# Patient Record
Sex: MALE | Race: OTHER | NOT HISPANIC OR LATINO
[De-identification: names, ages, dates, MRNs, and addresses within clinical notes are randomized per-mention and may not be internally consistent; named-entity substitution may affect disease eponyms.]

---

## 2019-01-05 ENCOUNTER — TRANSCRIPTION ENCOUNTER (OUTPATIENT)
Age: 34
End: 2019-01-05

## 2019-01-06 ENCOUNTER — INPATIENT (INPATIENT)
Facility: HOSPITAL | Age: 34
LOS: 3 days | Discharge: HOME | DRG: 654 | End: 2019-01-10
Attending: SURGERY | Admitting: SURGERY
Payer: COMMERCIAL

## 2019-01-06 VITALS — DIASTOLIC BLOOD PRESSURE: 82 MMHG | SYSTOLIC BLOOD PRESSURE: 132 MMHG | HEART RATE: 86 BPM

## 2019-01-06 DIAGNOSIS — N32.89 OTHER SPECIFIED DISORDERS OF BLADDER: ICD-10-CM

## 2019-01-06 LAB
ALBUMIN SERPL ELPH-MCNC: 4.7 G/DL — SIGNIFICANT CHANGE UP (ref 3.3–5)
ALP SERPL-CCNC: 70 U/L — SIGNIFICANT CHANGE UP (ref 40–120)
ALT FLD-CCNC: 31 U/L — SIGNIFICANT CHANGE UP (ref 10–45)
ANION GAP SERPL CALC-SCNC: 15 MMOL/L — SIGNIFICANT CHANGE UP (ref 5–17)
APTT BLD: 31 SEC — SIGNIFICANT CHANGE UP (ref 27.5–36.3)
AST SERPL-CCNC: 31 U/L — SIGNIFICANT CHANGE UP (ref 10–40)
BASOPHILS # BLD AUTO: 0 K/UL — SIGNIFICANT CHANGE UP (ref 0–0.2)
BASOPHILS NFR BLD AUTO: 0.3 % — SIGNIFICANT CHANGE UP (ref 0–2)
BILIRUB SERPL-MCNC: 0.7 MG/DL — SIGNIFICANT CHANGE UP (ref 0.2–1.2)
BLD GP AB SCN SERPL QL: NEGATIVE — SIGNIFICANT CHANGE UP
BUN SERPL-MCNC: 11 MG/DL — SIGNIFICANT CHANGE UP (ref 7–23)
CALCIUM SERPL-MCNC: 9.3 MG/DL — SIGNIFICANT CHANGE UP (ref 8.4–10.5)
CHLORIDE SERPL-SCNC: 102 MMOL/L — SIGNIFICANT CHANGE UP (ref 96–108)
CO2 SERPL-SCNC: 22 MMOL/L — SIGNIFICANT CHANGE UP (ref 22–31)
CREAT SERPL-MCNC: 1.07 MG/DL — SIGNIFICANT CHANGE UP (ref 0.5–1.3)
EOSINOPHIL # BLD AUTO: 0.1 K/UL — SIGNIFICANT CHANGE UP (ref 0–0.5)
EOSINOPHIL NFR BLD AUTO: 0.8 % — SIGNIFICANT CHANGE UP (ref 0–6)
ETHANOL SERPL-MCNC: 238 MG/DL — HIGH (ref 0–10)
GLUCOSE SERPL-MCNC: 133 MG/DL — HIGH (ref 70–99)
HCT VFR BLD CALC: 52.7 % — HIGH (ref 39–50)
HGB BLD-MCNC: 17.5 G/DL — HIGH (ref 13–17)
INR BLD: 0.96 RATIO — SIGNIFICANT CHANGE UP (ref 0.88–1.16)
LIDOCAIN IGE QN: 59 U/L — SIGNIFICANT CHANGE UP (ref 7–60)
LYMPHOCYTES # BLD AUTO: 29.8 % — SIGNIFICANT CHANGE UP (ref 13–44)
LYMPHOCYTES # BLD AUTO: 4.3 K/UL — HIGH (ref 1–3.3)
MCHC RBC-ENTMCNC: 31.5 PG — SIGNIFICANT CHANGE UP (ref 27–34)
MCHC RBC-ENTMCNC: 33.3 GM/DL — SIGNIFICANT CHANGE UP (ref 32–36)
MCV RBC AUTO: 94.7 FL — SIGNIFICANT CHANGE UP (ref 80–100)
MONOCYTES # BLD AUTO: 0.5 K/UL — SIGNIFICANT CHANGE UP (ref 0–0.9)
MONOCYTES NFR BLD AUTO: 3.7 % — SIGNIFICANT CHANGE UP (ref 2–14)
NEUTROPHILS # BLD AUTO: 9.5 K/UL — HIGH (ref 1.8–7.4)
NEUTROPHILS NFR BLD AUTO: 65.5 % — SIGNIFICANT CHANGE UP (ref 43–77)
PLATELET # BLD AUTO: 225 K/UL — SIGNIFICANT CHANGE UP (ref 150–400)
POTASSIUM SERPL-MCNC: 3.7 MMOL/L — SIGNIFICANT CHANGE UP (ref 3.5–5.3)
POTASSIUM SERPL-SCNC: 3.7 MMOL/L — SIGNIFICANT CHANGE UP (ref 3.5–5.3)
PROT SERPL-MCNC: 7.3 G/DL — SIGNIFICANT CHANGE UP (ref 6–8.3)
PROTHROM AB SERPL-ACNC: 10.9 SEC — SIGNIFICANT CHANGE UP (ref 10–12.9)
RBC # BLD: 5.57 M/UL — SIGNIFICANT CHANGE UP (ref 4.2–5.8)
RBC # FLD: 11.9 % — SIGNIFICANT CHANGE UP (ref 10.3–14.5)
RH IG SCN BLD-IMP: POSITIVE — SIGNIFICANT CHANGE UP
RH IG SCN BLD-IMP: POSITIVE — SIGNIFICANT CHANGE UP
SODIUM SERPL-SCNC: 139 MMOL/L — SIGNIFICANT CHANGE UP (ref 135–145)
WBC # BLD: 14.6 K/UL — HIGH (ref 3.8–10.5)
WBC # FLD AUTO: 14.6 K/UL — HIGH (ref 3.8–10.5)

## 2019-01-06 PROCEDURE — 99285 EMERGENCY DEPT VISIT HI MDM: CPT | Mod: 25

## 2019-01-06 PROCEDURE — 99053 MED SERV 10PM-8AM 24 HR FAC: CPT

## 2019-01-06 PROCEDURE — 99222 1ST HOSP IP/OBS MODERATE 55: CPT | Mod: 57

## 2019-01-06 PROCEDURE — 70450 CT HEAD/BRAIN W/O DYE: CPT | Mod: 26

## 2019-01-06 PROCEDURE — 73030 X-RAY EXAM OF SHOULDER: CPT | Mod: 26,RT

## 2019-01-06 PROCEDURE — 72125 CT NECK SPINE W/O DYE: CPT | Mod: 26

## 2019-01-06 PROCEDURE — 51865 REPAIR OF BLADDER WOUND: CPT

## 2019-01-06 PROCEDURE — 73090 X-RAY EXAM OF FOREARM: CPT | Mod: 26,LT

## 2019-01-06 PROCEDURE — 73110 X-RAY EXAM OF WRIST: CPT | Mod: 26,RT

## 2019-01-06 PROCEDURE — 74177 CT ABD & PELVIS W/CONTRAST: CPT | Mod: 26

## 2019-01-06 PROCEDURE — 73060 X-RAY EXAM OF HUMERUS: CPT | Mod: 26,RT

## 2019-01-06 PROCEDURE — 71260 CT THORAX DX C+: CPT | Mod: 26

## 2019-01-06 PROCEDURE — 72192 CT PELVIS W/O DYE: CPT | Mod: 26

## 2019-01-06 PROCEDURE — 73120 X-RAY EXAM OF HAND: CPT | Mod: 26,RT

## 2019-01-06 PROCEDURE — 99222 1ST HOSP IP/OBS MODERATE 55: CPT

## 2019-01-06 RX ORDER — HYDROMORPHONE HYDROCHLORIDE 2 MG/ML
0.5 INJECTION INTRAMUSCULAR; INTRAVENOUS; SUBCUTANEOUS
Qty: 0 | Refills: 0 | Status: DISCONTINUED | OUTPATIENT
Start: 2019-01-06 | End: 2019-01-08

## 2019-01-06 RX ORDER — DOCUSATE SODIUM 100 MG
100 CAPSULE ORAL THREE TIMES A DAY
Qty: 0 | Refills: 0 | Status: DISCONTINUED | OUTPATIENT
Start: 2019-01-06 | End: 2019-01-09

## 2019-01-06 RX ORDER — HYDROMORPHONE HYDROCHLORIDE 2 MG/ML
0.5 INJECTION INTRAMUSCULAR; INTRAVENOUS; SUBCUTANEOUS EVERY 4 HOURS
Qty: 0 | Refills: 0 | Status: DISCONTINUED | OUTPATIENT
Start: 2019-01-06 | End: 2019-01-06

## 2019-01-06 RX ORDER — FENTANYL CITRATE 50 UG/ML
50 INJECTION INTRAVENOUS ONCE
Qty: 0 | Refills: 0 | Status: DISCONTINUED | OUTPATIENT
Start: 2019-01-06 | End: 2019-01-06

## 2019-01-06 RX ORDER — ONDANSETRON 8 MG/1
4 TABLET, FILM COATED ORAL ONCE
Qty: 0 | Refills: 0 | Status: DISCONTINUED | OUTPATIENT
Start: 2019-01-06 | End: 2019-01-06

## 2019-01-06 RX ORDER — HYDROMORPHONE HYDROCHLORIDE 2 MG/ML
30 INJECTION INTRAMUSCULAR; INTRAVENOUS; SUBCUTANEOUS
Qty: 0 | Refills: 0 | Status: DISCONTINUED | OUTPATIENT
Start: 2019-01-06 | End: 2019-01-08

## 2019-01-06 RX ORDER — CEFTRIAXONE 500 MG/1
1 INJECTION, POWDER, FOR SOLUTION INTRAMUSCULAR; INTRAVENOUS ONCE
Qty: 0 | Refills: 0 | Status: COMPLETED | OUTPATIENT
Start: 2019-01-06 | End: 2019-01-06

## 2019-01-06 RX ORDER — LIDOCAINE 4 G/100G
1 CREAM TOPICAL DAILY
Qty: 0 | Refills: 0 | Status: DISCONTINUED | OUTPATIENT
Start: 2019-01-06 | End: 2019-01-09

## 2019-01-06 RX ORDER — TETANUS TOXOID, REDUCED DIPHTHERIA TOXOID AND ACELLULAR PERTUSSIS VACCINE, ADSORBED 5; 2.5; 8; 8; 2.5 [IU]/.5ML; [IU]/.5ML; UG/.5ML; UG/.5ML; UG/.5ML
0.5 SUSPENSION INTRAMUSCULAR ONCE
Qty: 0 | Refills: 0 | Status: COMPLETED | OUTPATIENT
Start: 2019-01-06 | End: 2019-01-06

## 2019-01-06 RX ORDER — CEFTRIAXONE 500 MG/1
1 INJECTION, POWDER, FOR SOLUTION INTRAMUSCULAR; INTRAVENOUS EVERY 24 HOURS
Qty: 0 | Refills: 0 | Status: DISCONTINUED | OUTPATIENT
Start: 2019-01-07 | End: 2019-01-08

## 2019-01-06 RX ORDER — SENNA PLUS 8.6 MG/1
2 TABLET ORAL AT BEDTIME
Qty: 0 | Refills: 0 | Status: DISCONTINUED | OUTPATIENT
Start: 2019-01-06 | End: 2019-01-09

## 2019-01-06 RX ORDER — CEFTRIAXONE 500 MG/1
INJECTION, POWDER, FOR SOLUTION INTRAMUSCULAR; INTRAVENOUS
Qty: 0 | Refills: 0 | Status: DISCONTINUED | OUTPATIENT
Start: 2019-01-06 | End: 2019-01-08

## 2019-01-06 RX ORDER — OXYBUTYNIN CHLORIDE 5 MG
5 TABLET ORAL EVERY 8 HOURS
Qty: 0 | Refills: 0 | Status: DISCONTINUED | OUTPATIENT
Start: 2019-01-06 | End: 2019-01-09

## 2019-01-06 RX ORDER — ONDANSETRON 8 MG/1
4 TABLET, FILM COATED ORAL EVERY 6 HOURS
Qty: 0 | Refills: 0 | Status: DISCONTINUED | OUTPATIENT
Start: 2019-01-06 | End: 2019-01-08

## 2019-01-06 RX ORDER — HYDROMORPHONE HYDROCHLORIDE 2 MG/ML
0.5 INJECTION INTRAMUSCULAR; INTRAVENOUS; SUBCUTANEOUS
Qty: 0 | Refills: 0 | Status: DISCONTINUED | OUTPATIENT
Start: 2019-01-06 | End: 2019-01-06

## 2019-01-06 RX ORDER — ACETAMINOPHEN 500 MG
1000 TABLET ORAL ONCE
Qty: 0 | Refills: 0 | Status: DISCONTINUED | OUTPATIENT
Start: 2019-01-06 | End: 2019-01-06

## 2019-01-06 RX ORDER — ENOXAPARIN SODIUM 100 MG/ML
40 INJECTION SUBCUTANEOUS DAILY
Qty: 0 | Refills: 0 | Status: DISCONTINUED | OUTPATIENT
Start: 2019-01-06 | End: 2019-01-09

## 2019-01-06 RX ORDER — HEPARIN SODIUM 5000 [USP'U]/ML
5000 INJECTION INTRAVENOUS; SUBCUTANEOUS EVERY 8 HOURS
Qty: 0 | Refills: 0 | Status: DISCONTINUED | OUTPATIENT
Start: 2019-01-06 | End: 2019-01-06

## 2019-01-06 RX ORDER — SODIUM CHLORIDE 9 MG/ML
1000 INJECTION INTRAMUSCULAR; INTRAVENOUS; SUBCUTANEOUS
Qty: 0 | Refills: 0 | Status: DISCONTINUED | OUTPATIENT
Start: 2019-01-06 | End: 2019-01-08

## 2019-01-06 RX ORDER — NALOXONE HYDROCHLORIDE 4 MG/.1ML
0.1 SPRAY NASAL
Qty: 0 | Refills: 0 | Status: DISCONTINUED | OUTPATIENT
Start: 2019-01-06 | End: 2019-01-08

## 2019-01-06 RX ORDER — SODIUM CHLORIDE 9 MG/ML
1000 INJECTION INTRAMUSCULAR; INTRAVENOUS; SUBCUTANEOUS
Qty: 0 | Refills: 0 | Status: DISCONTINUED | OUTPATIENT
Start: 2019-01-06 | End: 2019-01-06

## 2019-01-06 RX ORDER — ACETAMINOPHEN 500 MG
650 TABLET ORAL ONCE
Qty: 0 | Refills: 0 | Status: DISCONTINUED | OUTPATIENT
Start: 2019-01-06 | End: 2019-01-06

## 2019-01-06 RX ADMIN — TETANUS TOXOID, REDUCED DIPHTHERIA TOXOID AND ACELLULAR PERTUSSIS VACCINE, ADSORBED 0.5 MILLILITER(S): 5; 2.5; 8; 8; 2.5 SUSPENSION INTRAMUSCULAR at 05:28

## 2019-01-06 RX ADMIN — SENNA PLUS 2 TABLET(S): 8.6 TABLET ORAL at 21:20

## 2019-01-06 RX ADMIN — FENTANYL CITRATE 50 MICROGRAM(S): 50 INJECTION INTRAVENOUS at 08:25

## 2019-01-06 RX ADMIN — SODIUM CHLORIDE 100 MILLILITER(S): 9 INJECTION INTRAMUSCULAR; INTRAVENOUS; SUBCUTANEOUS at 15:07

## 2019-01-06 RX ADMIN — FENTANYL CITRATE 50 MICROGRAM(S): 50 INJECTION INTRAVENOUS at 08:10

## 2019-01-06 RX ADMIN — HYDROMORPHONE HYDROCHLORIDE 30 MILLILITER(S): 2 INJECTION INTRAMUSCULAR; INTRAVENOUS; SUBCUTANEOUS at 19:24

## 2019-01-06 RX ADMIN — Medication 100 MILLIGRAM(S): at 21:20

## 2019-01-06 RX ADMIN — HYDROMORPHONE HYDROCHLORIDE 30 MILLILITER(S): 2 INJECTION INTRAMUSCULAR; INTRAVENOUS; SUBCUTANEOUS at 14:52
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
## 2019-01-06 NOTE — PRE-ANESTHESIA EVALUATION ADULT - ANESTHESIA, PREVIOUS REACTION, PROFILE
No known problems with GA/MAC in self or family No known problems with GA/MAC in self or family/none

## 2019-01-06 NOTE — ED PROVIDER NOTE - CONSTITUTIONAL, MLM
normal... Well appearing, well nourished, awake, alert, oriented to person, place. Clinically intoxicated
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
## 2019-01-06 NOTE — H&P ADULT - ATTENDING COMMENTS
CT cystogram shows intraperitoneal bladder rupture  I reviewed this with the urology team and taking patient to the operating room  patient also noted to have rib fractures and humerus fracture  will need orthopedic surgery input  will admit to trauma

## 2019-01-06 NOTE — BRIEF OPERATIVE NOTE - COMMENTS
-- start CTX given intraperitoneal perforation  -- keep mims, pt to be discharged w/mims  -- outpt cystogram  -- CLD, advance w/flatus  -- PCA -- start CTX given intraperitoneal perforation  -- keep mims, pt to be discharged w/mims  -- outpt cystogram  -- CLD, advance w/flatus  -- ditropan PRN  -- PCA

## 2019-01-06 NOTE — ED PROVIDER NOTE - MUSCULOSKELETAL, MLM
Strength appropriate for age. Tenderness over distal R wrist w/o obvious deformity. Strength appropriate for age. Tenderness over distal R wrist w/o obvious deformity other than some dry blood. No obvious open fx.
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
## 2019-01-06 NOTE — ED ADULT NURSE REASSESSMENT NOTE - NS ED NURSE REASSESS COMMENT FT1
Patient back from CT. Placed on CM. Indwelling urinary catheter draining red fluid in urometer to gravity.
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
## 2019-01-06 NOTE — H&P ADULT - NSHPPHYSICALEXAM_GEN_ALL_CORE
General: awake, alert, answering questions appropriately  HEENT: PERRL, some old blood in mouth, no blood in ears, no blood in nares  Neck: nontender, no deformity   CV: normotensive, tachycardic  Pulm: breathing comfortably  Chest: left lateral and posterior chest wall tenderness  Back: lumbar paraspinal tenderness, no deformity  Abd: lower abdominal tenderness without rebound or guarding  : Rosas placed with return of hematuria  Extremities: spontaneous movement in all extremities, RUE without palpable deformity, pain on R should ROM,  strength 4/4 b/l  Vascular: b/l palpable radial pulses
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
## 2019-01-06 NOTE — H&P ADULT - NSHPLABSRESULTS_GEN_ALL_CORE
CBC (01-06 @ 05:11)                              17.5<H>                         14.6<H>  )----------------(  225        65.5  % Neutrophils, 29.8  % Lymphocytes, ANC: 9.5<H>                              52.7<H>                BMP (01-06 @ 05:11)             139     |  102     |  11    		Ca++ --      Ca 9.3                ---------------------------------( 133<H>		Mg --                 3.7     |  22      |  1.07  			Ph --        LFTs (01-06 @ 05:11)      TPro 7.3 / Alb 4.7 / TBili 0.7 / DBili -- / AST 31 / ALT 31 / AlkPhos 70    Coags (01-06 @ 05:11)  aPTT 31.0 / INR 0.96 / PT 10.9    CT Pelvis No Cont (01.06.19 @ 09:05)     BLADDER: There is intraperitoneal and extraperitoneal extravasation of   intravesicular contrast tracking cephalad into the abdomen. Open   communication between the bladder lumen and intraperitoneal and   extraperitoneal spaces is identified along the dome of the bladder,   slightly eccentric to the right (605:65 and 4:26). The amount of   intra-abdominal contrast is increased after intravesicular   administration. A Rosas catheter is present within the bladder.    Contrast is present in the bilateral distal ureters.    REPRODUCTIVE ORGANS: The prostate is within normal limits.  LYMPH NODES: No pelvic lymphadenopathy.    VISUALIZED PORTIONS:    ABDOMINAL ORGANS: Within normal limits.  BOWEL: Within normal limits.  PERITONEUM: No ascites.  VESSELS: Within normal limits.  ABDOMINAL WALL: Within normal limits.  BONES: Within normal limits.    IMPRESSION:   Findings consistent with bladder rupture with perforation of the bladder   along the dome, slightly eccentric to the right.    Xray Shoulder 2 Views, Right (01.06.19 @ 09:47)     Acute fractures through the right humeral head and the greater tuberosity   of the humeral head - are seen with minimal lateral displacement of the   greater tuberosity noted. No right glenohumeral dislocation.  The right scapular right acromioclavicular joint are maintained    IMPRESSION:   Acute fractures of the right humeral head and greater tuberosity of the   right humeral head.

## 2019-01-06 NOTE — PROGRESS NOTE ADULT - SUBJECTIVE AND OBJECTIVE BOX
Post op Check    Pt seen and examined complaining of incisional and right shoulder pain  Denies SOB/CP/N/V.     Vital Signs Last 24 Hrs  T(C): 36.4 (06 Jan 2019 16:00), Max: 37.1 (06 Jan 2019 14:05)  T(F): 97.5 (06 Jan 2019 16:00), Max: 98.8 (06 Jan 2019 14:05)  HR: 100 (06 Jan 2019 16:00) (86 - 113)  BP: 138/63 (06 Jan 2019 16:00) (111/54 - 144/66)  BP(mean): 89 (06 Jan 2019 16:00) (77 - 96)  RR: 16 (06 Jan 2019 16:00) (16 - 26)  SpO2: 100% (06 Jan 2019 16:00) (97% - 100%)    I&O's Summary    06 Jan 2019 07:01  -  06 Jan 2019 17:04  --------------------------------------------------------  IN: 300 mL / OUT: 220 mL / NET: 80 mL        Physical Exam  Gen: awake alert NAD AXOX3  Pulm: No respiratory distress, no subcostal retractions  CV: RRR, no JVD  Abd: Soft, approp  tender,  ND incision c/d/i  cadence serosang     : mims in place yellow urine                            17.5   14.6  )-----------( 225      ( 06 Jan 2019 05:11 )             52.7       01-06    139  |  102  |  11  ----------------------------<  133<H>  3.7   |  22  |  1.07    Ca    9.3      06 Jan 2019 05:11    TPro  7.3  /  Alb  4.7  /  TBili  0.7  /  DBili  x   /  AST  31  /  ALT  31  /  AlkPhos  70  01-06
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
## 2019-01-06 NOTE — PRE-ANESTHESIA EVALUATION ADULT - NSANTHADDINFOFT_GEN_ALL_CORE
Discussed GA with patient in detail and all questions sought and answered. Pt. agrees to all plans and wishes to proceed with emergent bladder repair.

## 2019-01-06 NOTE — CONSULT NOTE ADULT - ATTENDING COMMENTS
discussed with patient plan for OR to repair the bladder. Risks of surgery such as persistent leak, injury to the surrounding organs, etc, were discussed. He understands.
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
## 2019-01-07 ENCOUNTER — TRANSCRIPTION ENCOUNTER (OUTPATIENT)
Age: 34
End: 2019-01-07

## 2019-01-07 LAB
ANION GAP SERPL CALC-SCNC: 13 MMOL/L — SIGNIFICANT CHANGE UP (ref 5–17)
BUN SERPL-MCNC: 9 MG/DL — SIGNIFICANT CHANGE UP (ref 7–23)
CALCIUM SERPL-MCNC: 8.9 MG/DL — SIGNIFICANT CHANGE UP (ref 8.4–10.5)
CHLORIDE SERPL-SCNC: 102 MMOL/L — SIGNIFICANT CHANGE UP (ref 96–108)
CO2 SERPL-SCNC: 20 MMOL/L — LOW (ref 22–31)
CREAT SERPL-MCNC: 0.57 MG/DL — SIGNIFICANT CHANGE UP (ref 0.5–1.3)
GLUCOSE SERPL-MCNC: 108 MG/DL — HIGH (ref 70–99)
HCT VFR BLD CALC: 45.4 % — SIGNIFICANT CHANGE UP (ref 39–50)
HGB BLD-MCNC: 14.7 G/DL — SIGNIFICANT CHANGE UP (ref 13–17)
MAGNESIUM SERPL-MCNC: 2.2 MG/DL — SIGNIFICANT CHANGE UP (ref 1.6–2.6)
MCHC RBC-ENTMCNC: 31.1 PG — SIGNIFICANT CHANGE UP (ref 27–34)
MCHC RBC-ENTMCNC: 32.4 GM/DL — SIGNIFICANT CHANGE UP (ref 32–36)
MCV RBC AUTO: 96.2 FL — SIGNIFICANT CHANGE UP (ref 80–100)
PHOSPHATE SERPL-MCNC: 3 MG/DL — SIGNIFICANT CHANGE UP (ref 2.5–4.5)
PLATELET # BLD AUTO: 204 K/UL — SIGNIFICANT CHANGE UP (ref 150–400)
POTASSIUM SERPL-MCNC: 4.6 MMOL/L — SIGNIFICANT CHANGE UP (ref 3.5–5.3)
POTASSIUM SERPL-SCNC: 4.6 MMOL/L — SIGNIFICANT CHANGE UP (ref 3.5–5.3)
RBC # BLD: 4.72 M/UL — SIGNIFICANT CHANGE UP (ref 4.2–5.8)
RBC # FLD: 13.4 % — SIGNIFICANT CHANGE UP (ref 10.3–14.5)
SODIUM SERPL-SCNC: 135 MMOL/L — SIGNIFICANT CHANGE UP (ref 135–145)
WBC # BLD: 17.68 K/UL — HIGH (ref 3.8–10.5)
WBC # FLD AUTO: 17.68 K/UL — HIGH (ref 3.8–10.5)

## 2019-01-07 PROCEDURE — 99232 SBSQ HOSP IP/OBS MODERATE 35: CPT

## 2019-01-07 RX ORDER — ACETAMINOPHEN 500 MG
1000 TABLET ORAL ONCE
Qty: 0 | Refills: 0 | Status: COMPLETED | OUTPATIENT
Start: 2019-01-07 | End: 2019-01-07

## 2019-01-07 RX ADMIN — HYDROMORPHONE HYDROCHLORIDE 30 MILLILITER(S): 2 INJECTION INTRAMUSCULAR; INTRAVENOUS; SUBCUTANEOUS at 07:32

## 2019-01-07 RX ADMIN — LIDOCAINE 1 PATCH: 4 CREAM TOPICAL at 13:28

## 2019-01-07 RX ADMIN — Medication 100 MILLIGRAM(S): at 04:47

## 2019-01-07 RX ADMIN — Medication 100 MILLIGRAM(S): at 13:28

## 2019-01-07 RX ADMIN — Medication 400 MILLIGRAM(S): at 16:43

## 2019-01-07 RX ADMIN — SENNA PLUS 2 TABLET(S): 8.6 TABLET ORAL at 21:16

## 2019-01-07 RX ADMIN — Medication 100 MILLIGRAM(S): at 21:16

## 2019-01-07 RX ADMIN — CEFTRIAXONE 100 GRAM(S): 500 INJECTION, POWDER, FOR SOLUTION INTRAMUSCULAR; INTRAVENOUS at 13:49

## 2019-01-07 RX ADMIN — SODIUM CHLORIDE 100 MILLILITER(S): 9 INJECTION INTRAMUSCULAR; INTRAVENOUS; SUBCUTANEOUS at 04:47

## 2019-01-07 RX ADMIN — ENOXAPARIN SODIUM 40 MILLIGRAM(S): 100 INJECTION SUBCUTANEOUS at 13:28

## 2019-01-07 RX ADMIN — HYDROMORPHONE HYDROCHLORIDE 30 MILLILITER(S): 2 INJECTION INTRAMUSCULAR; INTRAVENOUS; SUBCUTANEOUS at 19:10
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
## 2019-01-07 NOTE — PROGRESS NOTE ADULT - SUBJECTIVE AND OBJECTIVE BOX
Day 1 of Anesthesia Pain Management Service    SUBJECTIVE: Patient is doing well with IV PCA    Pain Scale Score:	[X] Refer to charted pain scores    THERAPY:    [ ] IV PCA Morphine		[ ] 5 mg/mL	[ ] 1 mg/mL  [X] IV PCA Hydromorphone	[ ] 5 mg/mL	[X] 1 mg/mL  [ ] IV PCA Fentanyl		[ ] 50 micrograms/mL    Demand dose: 0.2 mg     Lockout: 6 minutes   Continuous Rate: 0 mg/hr  4 Hour Limit: 4 mg    MEDICATIONS  (STANDING):  cefTRIAXone   IVPB      cefTRIAXone   IVPB 1 Gram(s) IV Intermittent every 24 hours  docusate sodium 100 milliGRAM(s) Oral three times a day  enoxaparin Injectable 40 milliGRAM(s) SubCutaneous daily  HYDROmorphone PCA (1 mG/mL) 30 milliLiter(s) PCA Continuous PCA Continuous  lidocaine   Patch 1 Patch Transdermal daily  senna 2 Tablet(s) Oral at bedtime  sodium chloride 0.9%. 1000 milliLiter(s) (20 mL/Hr) IV Continuous <Continuous>    MEDICATIONS  (PRN):  HYDROmorphone PCA (1 mG/mL) Rescue Clinician Bolus 0.5 milliGRAM(s) IV Push every 15 minutes PRN for Pain Scale GREATER THAN 6  naloxone Injectable 0.1 milliGRAM(s) IV Push every 3 minutes PRN For ANY of the following changes in patient status:  A. RR LESS THAN 10 breaths per minute, B. Oxygen saturation LESS THAN 90%, C. Sedation score of 6  ondansetron Injectable 4 milliGRAM(s) IV Push every 6 hours PRN Nausea  oxybutynin 5 milliGRAM(s) Oral every 8 hours PRN Bladder spasms      OBJECTIVE:    Sedation Score:	[ X] Alert	[ ] Drowsy 	[ ] Arousable	[ ] Asleep	[ ] Unresponsive    Side Effects:	[X ] None	[ ] Nausea	[ ] Vomiting	[ ] Pruritus  		[ ] Other:    Vital Signs Last 24 Hrs  T(C): 36.8 (07 Jan 2019 04:58), Max: 37.2 (06 Jan 2019 20:01)  T(F): 98.3 (07 Jan 2019 04:58), Max: 98.9 (06 Jan 2019 20:01)  HR: 88 (07 Jan 2019 04:58) (88 - 113)  BP: 118/79 (07 Jan 2019 04:58) (111/54 - 144/66)  BP(mean): 89 (06 Jan 2019 16:00) (77 - 96)  RR: 18 (07 Jan 2019 04:58) (16 - 26)  SpO2: 96% (07 Jan 2019 04:58) (95% - 100%)    ASSESSMENT/ PLAN    Therapy to  be:               [X] Continued   [ ] Discontinued   [ ] Changed to PRN Analgesics    Documentation and Verification of current medications:   [X] Done	[ ] Not done, not eligible    Comments: Using 1-4x/hr. Reeducated to use.
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
## 2019-01-07 NOTE — DISCHARGE NOTE ADULT - CARE PROVIDERS DIRECT ADDRESSES
[email or web address]

## 2019-01-07 NOTE — DISCHARGE NOTE ADULT - PATIENT PORTAL LINK FT
You can access the [facility] Patient Portal, offered by [facility], by registering with the following website: [email or web address]

## 2019-01-07 NOTE — DISCHARGE NOTE ADULT - CARE PLAN
Principal Discharge DX:	Bladder rupture  Goal:	[facility]  Assessment and plan of treatment:	Discharge home with 3 way mims,  Abx for 3 days total, Outpatient Cystogram in 10-14 days prior to removing mims.   Activity- No heavy lifting or straining over 15 lbs for the next two weeks;  Driving- Please do not drive until your pain is well controlled and you do not need to take pain medications.  You may shower-Do not submerge or scrub incision sites.  Please pat dry incisions/dressings.  Leave the white steri strips in place, they will fall off on their own in approximately 5-7 days.  Secondary Diagnosis:	Closed fracture of head of right humerus with malunion, subsequent encounter  Goal:	Healing  Assessment and plan of treatment:	Ortho: Non weight bearing to RUE, sling, non-op management.  Active movement of fingers/elbow encouraged  Follow up with Dr Topete in 2 weeks.  Secondary Diagnosis:	Closed fracture of multiple ribs of right side with nonunion, subsequent encounter  Goal:	Pain control  Assessment and plan of treatment:	Please follow up with your Trauma Doctor Dr Valencia only if needed at [address] [place] [address] [address] [place] [address] , phone #[phone].   You may use Salanpas 1% lidocaine patches over the counter for topical pain relief. Principal Discharge DX:	Bladder rupture  Goal:	Mims [facility]  Assessment and plan of treatment:	Discharge home with 3 way mims,  Abx for 3 days total,  Outpatient cystogram prior to removal of Mims in 2 weeks.  Please call for an appointment.    Activity- No heavy lifting or straining over 15 lbs for the next two weeks;  Driving- Please do not drive until your pain is well controlled and you do not need to take pain medications.  You may shower-Do not submerge or scrub incision sites.  Please pat dry incisions/dressings.  Leave the white steri strips in place, they will fall off on their own in approximately 5-7 days.  Secondary Diagnosis:	Closed fracture of head of right humerus with malunion, subsequent encounter  Goal:	Healing  Assessment and plan of treatment:	Ortho: Non weight bearing to RUE, sling, non-op management.  Active movement of fingers/elbow encouraged  Follow up with Dr Topete in 2 weeks.  Secondary Diagnosis:	Closed fracture of multiple ribs of right side with nonunion, subsequent encounter  Goal:	Pain control  Assessment and plan of treatment:	Please follow up with your Trauma Doctor Dr Valencia only if needed at [address] [place] [address] [address] [place] [address] , phone #[phone].   You may use Salanpas 1% lidocaine patches over the counter for topical pain relief. Principal Discharge DX:	Bladder rupture  Goal:	Mims Care  Assessment and plan of treatment:	Discharge home with 3 way mims,  Abx for 3 days total,  Outpatient cystogram prior to removal of Mims in 2 weeks.  Please call for an appointment.    Activity- No heavy lifting or straining over 15 lbs for the next two weeks;  Driving- Please do not drive until your pain is well controlled and you do not need to take pain medications.  You may shower-Do not submerge or scrub incision sites.  Please pat dry incisions/dressings.  Leave the white steri strips in place, they will fall off on their own in approximately 5-7 days.  Secondary Diagnosis:	Closed fracture of head of right humerus with malunion, subsequent encounter  Goal:	Healing  Assessment and plan of treatment:	Ortho: Non weight bearing to RUE, sling, non-op management.  Active movement of fingers/elbow encouraged  Follow up with Dr Topete in 2 weeks.  Secondary Diagnosis:	Closed fracture of multiple ribs of right side with nonunion, subsequent encounter  Goal:	Pain control  Assessment and plan of treatment:	Please follow up with your Trauma Doctor Dr Valencia only if needed at [address] [place] [address] , phone #[phone].   You may use Salanpas 1% lidocaine patches over the counter for topical pain relief.  Goal:	MMF  Assessment and plan of treatment:	Please follow up with Dr. Meadows. You may call the office at [phone].  Use Emilee-dex mouthwash as prescribed. Continue with oral hygiene- brush after every meal.  Take antibiotics as prescribed  Keep wirecutters with you at all times Principal Discharge DX:	Bladder rupture  Goal:	Mims [facility]  Assessment and plan of treatment:	Discharge home with 3 way mims. You will need an outpatient cystogram prior to removal of Mims in 2 weeks.  [facility] call for an appointment at [facility] .     Activity- No heavy lifting or straining over 15 lbs for the next two weeks;  Driving- Please do not drive until your pain is well controlled and you do not need to take pain medications.  You may shower-Do not submerge or scrub incision sites.  Please pat dry incisions/dressings.  Leave the white steri strips in place, they will fall off on their own in approximately 5-7 days.  Secondary Diagnosis:	Closed fracture of head of right humerus with malunion, subsequent encounter  Goal:	Healing  Assessment and plan of treatment:	Ortho: Non weight bearing to RUE, sling, non-op management.  Active movement of fingers/elbow encouraged  Follow up with Dr Topete in 2 weeks.  Secondary Diagnosis:	Closed fracture of multiple ribs of right side with nonunion, subsequent encounter  Goal:	Pain control  Assessment and plan of treatment:	Please follow up with your Trauma Doctor Dr Valencia only if needed at [address] [place] [address] , phone #[phone].   You may use Salanpas 1% lidocaine patches over the counter for topical pain relief.  Secondary Diagnosis:	Closed fracture of left side of mandible with nonunion, unspecified mandibular site, subsequent encounter  Goal:	MMF  Assessment and plan of treatment:	Please follow up with Dr. Meadows. You may call the office at [phone].  Use Emilee-dex mouthwash 2-3x a day. Continue with oral hygiene- brush after every meal.  Take antibiotics as prescribed (1 week)  Keep wirecutters with you at all times.   Dental F/U with outpatient dentist for comprehensive dental care.

## 2019-01-07 NOTE — DISCHARGE NOTE ADULT - PROVIDER TOKENS
TOKEN:'[id number]',TOKTIFFANY:'[id number]',DARCIE:'[id number]' TOKEN:'[id number]',TOKEN:'[id number]',TOKEN:'[id number]',DARCIE:'[id number]'

## 2019-01-07 NOTE — DISCHARGE NOTE ADULT - CARE PROVIDER_API CALL
Salazar Del Valle), Urology  [address] [facility]  [address]  [place] [address]  Phone: [phone]  Fax: [phone]    Rohit Valencia), Surgery; Surgical Critical Care  [address] [place] [address]  Phone: [phone]  Fax: [phone]    Bill Topete), Orthopaedic Surgery  [address] [place] [address] [address]  [place] [address]  Phone: [phone]  Fax: [phone] Salazar Del Valle), Urology  [address] [facility]  [address]  [place] [address]  Phone: [phone]  Fax: [phone]    Rohit Valencia), Surgery; Surgical Critical Care  [address] [place] [address]  Phone: [phone]  Fax: [phone]    Bill Topete), Orthopaedic Surgery  [address] [facility]  [address]  [place] [address]  Phone: [phone]  Fax: [phone]    dEin Meadows (DDS), OralMaxillofacial Surgery  [address] [facility]  [address]  [place] [address]  Phone: [phone]  Fax: [phone]

## 2019-01-07 NOTE — DISCHARGE NOTE ADULT - MEDICATION SUMMARY - MEDICATIONS TO TAKE
I will START or STAY ON the medications listed below when I get home from the hospital:    acetaminophen 325 mg oral tablet  -- 2 tab(s) by mouth every 6 hours, As needed, Mild Pain (1 - 3)  -- Indication: For mild pain    oxyCODONE 5 mg oral tablet  -- 1-2  tab(s) by mouth every 4-6 hours, As Needed MDD:8  -- Caution federal law prohibits the transfer of this drug to any person other  than the person for whom it was prescribed.  It is very important that you take or use this exactly as directed.  Do not skip doses or discontinue unless directed by your doctor.  May cause drowsiness.  Alcohol may intensify this effect.  Use care when operating dangerous machinery.  This prescription cannot be refilled.  Using more of this medication than prescribed may cause serious breathing problems.    -- Indication: For mod pain    senna oral tablet  -- 2 tab(s) by mouth once a day (at bedtime)  -- Indication: For Laxative    docusate sodium 100 mg oral capsule  -- 1 cap(s) by mouth 3 times a day  -- Indication: For laxative    oxybutynin 5 mg oral tablet  -- 1 tab(s) by mouth every 8 hours, As needed, Bladder spasms  -- Indication: For Bladder spasms I will START or STAY ON the medications listed below when I get home from the hospital:    oxyCODONE 5 mg/5 mL oral solution  -- 5 milliliter(s) by mouth every 4 hours, As needed, Moderate Pain (4 - 6) MDD:30  -- Indication: For pain control    chlorhexidine 0.12% mucous membrane liquid  -- 15 milliliter(s) mucous membrane 2 times a day  -- Indication: For mandible fracture    Augmentin ES-600 oral liquid  -- 5 milliliter(s) by mouth 2 times a day   -- Expires___________________  Finish all this medication unless otherwise directed by prescriber.  Refrigerate and shake well.  Expires_______________________  Take with food or milk.    -- Indication: For mandible fracture    oxybutynin 5 mg oral tablet  -- 1 tab(s) by mouth every 8 hours, As needed, Bladder spasms  -- Indication: For Bladder spasms I will START or STAY ON the medications listed below when I get home from the hospital:    oxyCODONE 5 mg/5 mL oral solution  -- 5 milliliter(s) by mouth every 4 hours, As needed, Moderate Pain (4 - 6) MDD:30  -- Indication: For pain control    chlorhexidine 0.12% mucous membrane liquid  -- 15 milliliter(s) mucous membrane 2 times a day  -- Indication: For mandible fracture    Augmentin ES-600 oral liquid  -- 5 milliliter(s) by mouth 2 times a day   -- Expires___________________  Finish all this medication unless otherwise directed by prescriber.  Refrigerate and shake well.  Expires_______________________  Take with food or milk.    -- Indication: For mandible fracture    Augmentin 875 mg-125 mg oral tablet  -- 1 milligram(s) by mouth 2 times a day   -- Finish all this medication unless otherwise directed by prescriber.  Take with food or milk.    -- Indication: For antibiotic    oxybutynin 5 mg oral tablet  -- 1 tab(s) by mouth every 8 hours, As needed, Bladder spasms  -- Indication: For Bladder spasms
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
## 2019-01-07 NOTE — DISCHARGE NOTE ADULT - PLAN OF CARE
[facility] Discharge home with 3 way mims,  Abx for 3 days total, Outpatient Cystogram in 10-14 days prior to removing mims.   Activity- No heavy lifting or straining over 15 lbs for the next two weeks;  Driving- Please do not drive until your pain is well controlled and you do not need to take pain medications.  You may shower-Do not submerge or scrub incision sites.  Please pat dry incisions/dressings.  Leave the white steri strips in place, they will fall off on their own in approximately 5-7 days. [facility] Ortho: Non weight bearing to RUE, sling, non-op management.  Active movement of fingers/elbow encouraged  Follow up with Dr Topete in 2 weeks. Pain control Please follow up with your Trauma Doctor Dr Valencia only if needed at [address] [place] [address] [address], [place] [address] , phone #[phone].   You may use Salanpas 1% lidocaine patches over the counter for topical pain relief. Discharge home with 3 way mims,  Abx for 3 days total,  Outpatient cystogram prior to removal of Mims in 2 weeks.  Please call for an appointment.    Activity- No heavy lifting or straining over 15 lbs for the next two weeks;  Driving- Please do not drive until your pain is well controlled and you do not need to take pain medications.  You may shower-Do not submerge or scrub incision sites.  Please pat dry incisions/dressings.  Leave the white steri strips in place, they will fall off on their own in approximately 5-7 days. MM[facility] Please follow up with Dr. Meadows. You may call the office at [phone].  Use Emilee-dex mouthwash as prescribed. Continue with oral hygiene- brush after every meal.  Take antibiotics as prescribed  Keep wirecutters with you at all times Discharge home with 3 way mims. You will need an outpatient cystogram prior to removal of Mims in 2 weeks.  Please call for an appointment at [facility] .     Activity- No heavy lifting or straining over 15 lbs for the next two weeks;  Driving- Please do not drive until your pain is well controlled and you do not need to take pain medications.  You may shower-Do not submerge or scrub incision sites.  Please pat dry incisions/dressings.  Leave the white steri strips in place, they will fall off on their own in approximately 5-7 days. Please follow up with Dr. Meadows. You may call the office at [phone].  Use Emilee-dex mouthwash 2-3x a day. Continue with oral hygiene- brush after every meal.  Take antibiotics as prescribed (1 week)  Keep wirecutters with you at all times.   Dental F/U with outpatient dentist for comprehensive dental care.

## 2019-01-07 NOTE — DISCHARGE NOTE ADULT - ADDITIONAL INSTRUCTIONS
Your PMD--Please call for a follow up appointment upon discharge regarding your recent surgery and hospitalization. Your PMD--Please call for a follow up appointment upon discharge regarding your recent surgery and hospitalization.  Please follow up with Dr. Del Valle upon discharge. Please call to schedule an appointment.

## 2019-01-07 NOTE — DISCHARGE NOTE ADULT - INSTRUCTIONS
You may resume a regular diet. Full liquid diet.   Wirecutters to remain with you at all times Full liquid diet. Wirecutters to remain with you at all times

## 2019-01-08 ENCOUNTER — TRANSCRIPTION ENCOUNTER (OUTPATIENT)
Age: 34
End: 2019-01-08

## 2019-01-08 LAB
ANION GAP SERPL CALC-SCNC: 10 MMOL/L — SIGNIFICANT CHANGE UP (ref 5–17)
BUN SERPL-MCNC: 10 MG/DL — SIGNIFICANT CHANGE UP (ref 7–23)
CALCIUM SERPL-MCNC: 9.1 MG/DL — SIGNIFICANT CHANGE UP (ref 8.4–10.5)
CHLORIDE SERPL-SCNC: 101 MMOL/L — SIGNIFICANT CHANGE UP (ref 96–108)
CO2 SERPL-SCNC: 25 MMOL/L — SIGNIFICANT CHANGE UP (ref 22–31)
CREAT SERPL-MCNC: 0.64 MG/DL — SIGNIFICANT CHANGE UP (ref 0.5–1.3)
GLUCOSE SERPL-MCNC: 94 MG/DL — SIGNIFICANT CHANGE UP (ref 70–99)
HCT VFR BLD CALC: 43.3 % — SIGNIFICANT CHANGE UP (ref 39–50)
HGB BLD-MCNC: 14 G/DL — SIGNIFICANT CHANGE UP (ref 13–17)
MCHC RBC-ENTMCNC: 31.2 PG — SIGNIFICANT CHANGE UP (ref 27–34)
MCHC RBC-ENTMCNC: 32.3 GM/DL — SIGNIFICANT CHANGE UP (ref 32–36)
MCV RBC AUTO: 96.4 FL — SIGNIFICANT CHANGE UP (ref 80–100)
PLATELET # BLD AUTO: 213 K/UL — SIGNIFICANT CHANGE UP (ref 150–400)
POTASSIUM SERPL-MCNC: 4.1 MMOL/L — SIGNIFICANT CHANGE UP (ref 3.5–5.3)
POTASSIUM SERPL-SCNC: 4.1 MMOL/L — SIGNIFICANT CHANGE UP (ref 3.5–5.3)
RBC # BLD: 4.49 M/UL — SIGNIFICANT CHANGE UP (ref 4.2–5.8)
RBC # FLD: 13.3 % — SIGNIFICANT CHANGE UP (ref 10.3–14.5)
SODIUM SERPL-SCNC: 136 MMOL/L — SIGNIFICANT CHANGE UP (ref 135–145)
WBC # BLD: 13.34 K/UL — HIGH (ref 3.8–10.5)
WBC # FLD AUTO: 13.34 K/UL — HIGH (ref 3.8–10.5)

## 2019-01-08 PROCEDURE — 99232 SBSQ HOSP IP/OBS MODERATE 35: CPT

## 2019-01-08 PROCEDURE — 70486 CT MAXILLOFACIAL W/O DYE: CPT | Mod: 26

## 2019-01-08 PROCEDURE — 76377 3D RENDER W/INTRP POSTPROCES: CPT | Mod: 26

## 2019-01-08 RX ORDER — OXYCODONE HYDROCHLORIDE 5 MG/1
1 TABLET ORAL
Qty: 25 | Refills: 0 | OUTPATIENT
Start: 2019-01-08 | End: 2019-01-14

## 2019-01-08 RX ORDER — DEXTROSE MONOHYDRATE, SODIUM CHLORIDE, AND POTASSIUM CHLORIDE 50; .745; 4.5 G/1000ML; G/1000ML; G/1000ML
1000 INJECTION, SOLUTION INTRAVENOUS
Qty: 0 | Refills: 0 | Status: DISCONTINUED | OUTPATIENT
Start: 2019-01-08 | End: 2019-01-09

## 2019-01-08 RX ORDER — SENNA PLUS 8.6 MG/1
2 TABLET ORAL
Qty: 0 | Refills: 0 | COMMUNITY
Start: 2019-01-08

## 2019-01-08 RX ORDER — INFLUENZA VIRUS VACCINE 15; 15; 15; 15 UG/.5ML; UG/.5ML; UG/.5ML; UG/.5ML
0.5 SUSPENSION INTRAMUSCULAR ONCE
Qty: 0 | Refills: 0 | Status: DISCONTINUED | OUTPATIENT
Start: 2019-01-08 | End: 2019-01-10

## 2019-01-08 RX ORDER — OXYBUTYNIN CHLORIDE 5 MG
1 TABLET ORAL
Qty: 30 | Refills: 0 | OUTPATIENT
Start: 2019-01-08 | End: 2019-01-17

## 2019-01-08 RX ORDER — ACETAMINOPHEN 500 MG
650 TABLET ORAL EVERY 6 HOURS
Qty: 0 | Refills: 0 | Status: DISCONTINUED | OUTPATIENT
Start: 2019-01-08 | End: 2019-01-09

## 2019-01-08 RX ORDER — ACETAMINOPHEN 500 MG
2 TABLET ORAL
Qty: 0 | Refills: 0 | COMMUNITY
Start: 2019-01-08

## 2019-01-08 RX ORDER — OXYCODONE HYDROCHLORIDE 5 MG/1
5 TABLET ORAL EVERY 4 HOURS
Qty: 0 | Refills: 0 | Status: DISCONTINUED | OUTPATIENT
Start: 2019-01-08 | End: 2019-01-09

## 2019-01-08 RX ORDER — OXYCODONE HYDROCHLORIDE 5 MG/1
10 TABLET ORAL EVERY 4 HOURS
Qty: 0 | Refills: 0 | Status: DISCONTINUED | OUTPATIENT
Start: 2019-01-08 | End: 2019-01-09

## 2019-01-08 RX ORDER — DOCUSATE SODIUM 100 MG
1 CAPSULE ORAL
Qty: 0 | Refills: 0 | COMMUNITY
Start: 2019-01-08

## 2019-01-08 RX ADMIN — Medication 100 MILLIGRAM(S): at 22:00

## 2019-01-08 RX ADMIN — Medication 100 MILLIGRAM(S): at 13:39

## 2019-01-08 RX ADMIN — OXYCODONE HYDROCHLORIDE 5 MILLIGRAM(S): 5 TABLET ORAL at 13:44

## 2019-01-08 RX ADMIN — LIDOCAINE 1 PATCH: 4 CREAM TOPICAL at 19:30

## 2019-01-08 RX ADMIN — SENNA PLUS 2 TABLET(S): 8.6 TABLET ORAL at 22:00

## 2019-01-08 RX ADMIN — OXYCODONE HYDROCHLORIDE 5 MILLIGRAM(S): 5 TABLET ORAL at 21:59

## 2019-01-08 RX ADMIN — LIDOCAINE 1 PATCH: 4 CREAM TOPICAL at 13:39

## 2019-01-08 RX ADMIN — OXYCODONE HYDROCHLORIDE 5 MILLIGRAM(S): 5 TABLET ORAL at 14:14

## 2019-01-08 RX ADMIN — OXYCODONE HYDROCHLORIDE 5 MILLIGRAM(S): 5 TABLET ORAL at 22:30

## 2019-01-08 RX ADMIN — LIDOCAINE 1 PATCH: 4 CREAM TOPICAL at 00:00

## 2019-01-08 RX ADMIN — Medication 100 MILLIGRAM(S): at 05:23

## 2019-01-08 RX ADMIN — LIDOCAINE 1 PATCH: 4 CREAM TOPICAL at 05:25

## 2019-01-08 RX ADMIN — LIDOCAINE 1 PATCH: 4 CREAM TOPICAL at 23:39

## 2019-01-08 NOTE — PROGRESS NOTE ADULT - SUBJECTIVE AND OBJECTIVE BOX
Day 2 of Anesthesia Pain Management Service    SUBJECTIVE: Patient is doing well with IV PCA    Pain Scale Score:	[X] Refer to charted pain scores    THERAPY:    [ ] IV PCA Morphine		[ ] 5 mg/mL	[ ] 1 mg/mL  [X] IV PCA Hydromorphone	[ ] 5 mg/mL	[X] 1 mg/mL  [ ] IV PCA Fentanyl		[ ] 50 micrograms/mL    Demand dose: 0.2 mg     Lockout: 6 minutes   Continuous Rate: 0 mg/hr  4 Hour Limit: 4 mg    MEDICATIONS  (STANDING):  docusate sodium 100 milliGRAM(s) Oral three times a day  enoxaparin Injectable 40 milliGRAM(s) SubCutaneous daily  lidocaine   Patch 1 Patch Transdermal daily  senna 2 Tablet(s) Oral at bedtime    MEDICATIONS  (PRN):  acetaminophen   Tablet .. 650 milliGRAM(s) Oral every 6 hours PRN Mild Pain (1 - 3)  oxybutynin 5 milliGRAM(s) Oral every 8 hours PRN Bladder spasms  oxyCODONE    IR 5 milliGRAM(s) Oral every 4 hours PRN Moderate Pain (4 - 6)  oxyCODONE    IR 10 milliGRAM(s) Oral every 4 hours PRN Severe Pain (7 - 10)      OBJECTIVE:    Sedation Score:	[ X] Alert	[ ] Drowsy 	[ ] Arousable	[ ] Asleep	[ ] Unresponsive    Side Effects:	[X ] None	[ ] Nausea	[ ] Vomiting	[ ] Pruritus  		[ ] Other:    Vital Signs Last 24 Hrs  T(C): 36.7 (08 Jan 2019 09:48), Max: 37.3 (07 Jan 2019 17:33)  T(F): 98 (08 Jan 2019 09:48), Max: 99.1 (07 Jan 2019 17:33)  HR: 93 (08 Jan 2019 09:45) (77 - 93)  BP: 145/81 (08 Jan 2019 09:45) (113/73 - 145/81)  BP(mean): --  RR: 18 (08 Jan 2019 05:31) (18 - 18)  SpO2: 96% (08 Jan 2019 09:45) (94% - 99%)    ASSESSMENT/ PLAN    Therapy to  be:               [  ] Continued   [X ] Discontinued   [ X] Changed to PRN Analgesics    Documentation and Verification of current medications:   [X] Done	[ ] Not done, not eligible    Comments: PCA D\C'd by primary service
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
## 2019-01-08 NOTE — CHART NOTE - NSCHARTNOTEFT_GEN_A_CORE
Pain Management Attending Addendum    SUBJECTIVE: Patient doing well, pain controlled. will dc IV PCA and transition to prn analgesics.    Therapy:    [X] IV PCA         [ ] PRN Analgesics    OBJECTIVE:   [X] Pain appropriately controlled    [ ] Other:    Side Effects:  [X] None	             [ ] Nausea              [ ] Pruritis                	[ ] Other:    ASSESSMENT/PLAN:  Therapy changed to PRN analgesics
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
## 2019-01-08 NOTE — PROGRESS NOTE ADULT - SUBJECTIVE AND OBJECTIVE BOX
Surgery Progress Note    S:     Patient seen and examined. No acute events overnight.   No complaints this morning, pain controlled, tolerating regular diet, no n/v, passing flatus and BMs.    O:    Vital Signs Last 24 Hrs  T(C): 37 (08 Jan 2019 01:03), Max: 37.3 (07 Jan 2019 17:33)  T(F): 98.6 (08 Jan 2019 01:03), Max: 99.1 (07 Jan 2019 17:33)  HR: 80 (08 Jan 2019 01:03) (77 - 100)  BP: 117/75 (08 Jan 2019 01:03) (117/75 - 134/76)  BP(mean): --  RR: 18 (08 Jan 2019 01:03) (18 - 18)  SpO2: 94% (08 Jan 2019 01:03) (94% - 99%)    Physical Exam:  Gen: NAD  Resp: Unlabored breathing  Abd: soft, NTND, no rebound or guarding, low midline incision c/d/i staples, HÉCTOR serosanguinous  Ext: WWP, left shoulder sling  Skin: No rashes    I&O's Detail    06 Jan 2019 07:01  -  07 Jan 2019 07:00  --------------------------------------------------------  IN:    sodium chloride 0.9%.: 1500 mL  Total IN: 1500 mL    OUT:    Bulb: 45 mL    Indwelling Catheter - Urethral: 2500 mL  Total OUT: 2545 mL    Total NET: -1045 mL      07 Jan 2019 07:01  -  08 Jan 2019 05:36  --------------------------------------------------------  IN:    Oral Fluid: 560 mL  Total IN: 560 mL    OUT:    Bulb: 70 mL    Indwelling Catheter - Urethral: 2200 mL  Total OUT: 2270 mL    Total NET: -1710 mL          MEDICATIONS  (STANDING):  cefTRIAXone   IVPB 1 Gram(s) IV Intermittent every 24 hours  cefTRIAXone   IVPB      docusate sodium 100 milliGRAM(s) Oral three times a day  enoxaparin Injectable 40 milliGRAM(s) SubCutaneous daily  HYDROmorphone PCA (1 mG/mL) 30 milliLiter(s) PCA Continuous PCA Continuous  lidocaine   Patch 1 Patch Transdermal daily  senna 2 Tablet(s) Oral at bedtime  sodium chloride 0.9%. 1000 milliLiter(s) (20 mL/Hr) IV Continuous <Continuous>    MEDICATIONS  (PRN):  HYDROmorphone PCA (1 mG/mL) Rescue Clinician Bolus 0.5 milliGRAM(s) IV Push every 15 minutes PRN for Pain Scale GREATER THAN 6  naloxone Injectable 0.1 milliGRAM(s) IV Push every 3 minutes PRN For ANY of the following changes in patient status:  A. RR LESS THAN 10 breaths per minute, B. Oxygen saturation LESS THAN 90%, C. Sedation score of 6  ondansetron Injectable 4 milliGRAM(s) IV Push every 6 hours PRN Nausea  oxybutynin 5 milliGRAM(s) Oral every 8 hours PRN Bladder spasms      Labs:                          14.7   17.68 )-----------( 204      ( 07 Jan 2019 09:16 )             45.4       01-07    135  |  102  |  9   ----------------------------<  108<H>  4.6   |  20<L>  |  0.57    Ca    8.9      07 Jan 2019 07:23  Phos  3.0     01-07  Mg     2.2     01-07        Radiology: Surgery Progress Note    S:     Patient seen and examined. No acute events overnight.   Complaining of left sided jaw pain while talking and eating, pain controlled, tolerating regular diet, no n/v, passing flatus and BMs.    O:    Vital Signs Last 24 Hrs  T(C): 37 (08 Jan 2019 01:03), Max: 37.3 (07 Jan 2019 17:33)  T(F): 98.6 (08 Jan 2019 01:03), Max: 99.1 (07 Jan 2019 17:33)  HR: 80 (08 Jan 2019 01:03) (77 - 100)  BP: 117/75 (08 Jan 2019 01:03) (117/75 - 134/76)  BP(mean): --  RR: 18 (08 Jan 2019 01:03) (18 - 18)  SpO2: 94% (08 Jan 2019 01:03) (94% - 99%)    Physical Exam:  Gen: NAD  HEENT: new/worsening left sided jaw swelling, TTP  Resp: Unlabored breathing  Abd: soft, NTND, no rebound or guarding, low midline incision c/d/i staples, HÉCTOR serosanguinous  Ext: WWP, left shoulder sling  Skin: No rashes    I&O's Detail    06 Jan 2019 07:01  -  07 Jan 2019 07:00  --------------------------------------------------------  IN:    sodium chloride 0.9%.: 1500 mL  Total IN: 1500 mL    OUT:    Bulb: 45 mL    Indwelling Catheter - Urethral: 2500 mL  Total OUT: 2545 mL    Total NET: -1045 mL      07 Jan 2019 07:01  -  08 Jan 2019 05:36  --------------------------------------------------------  IN:    Oral Fluid: 560 mL  Total IN: 560 mL    OUT:    Bulb: 70 mL    Indwelling Catheter - Urethral: 2200 mL  Total OUT: 2270 mL    Total NET: -1710 mL          MEDICATIONS  (STANDING):  cefTRIAXone   IVPB 1 Gram(s) IV Intermittent every 24 hours  cefTRIAXone   IVPB      docusate sodium 100 milliGRAM(s) Oral three times a day  enoxaparin Injectable 40 milliGRAM(s) SubCutaneous daily  HYDROmorphone PCA (1 mG/mL) 30 milliLiter(s) PCA Continuous PCA Continuous  lidocaine   Patch 1 Patch Transdermal daily  senna 2 Tablet(s) Oral at bedtime  sodium chloride 0.9%. 1000 milliLiter(s) (20 mL/Hr) IV Continuous <Continuous>    MEDICATIONS  (PRN):  HYDROmorphone PCA (1 mG/mL) Rescue Clinician Bolus 0.5 milliGRAM(s) IV Push every 15 minutes PRN for Pain Scale GREATER THAN 6  naloxone Injectable 0.1 milliGRAM(s) IV Push every 3 minutes PRN For ANY of the following changes in patient status:  A. RR LESS THAN 10 breaths per minute, B. Oxygen saturation LESS THAN 90%, C. Sedation score of 6  ondansetron Injectable 4 milliGRAM(s) IV Push every 6 hours PRN Nausea  oxybutynin 5 milliGRAM(s) Oral every 8 hours PRN Bladder spasms      Labs:                          14.7   17.68 )-----------( 204      ( 07 Jan 2019 09:16 )             45.4       01-07    135  |  102  |  9   ----------------------------<  108<H>  4.6   |  20<L>  |  0.57    Ca    8.9      07 Jan 2019 07:23  Phos  3.0     01-07  Mg     2.2     01-07        Radiology:

## 2019-01-08 NOTE — PHYSICAL THERAPY INITIAL EVALUATION ADULT - PRECAUTIONS/LIMITATIONS, REHAB EVAL
CONT: CT Chest/Abd 1/6: Wall thickening and defect within the bladder dome with intraluminal hyperdensity and perivesical fluid extending to the sigmoid mesocolon, concerning for bladder injury. Acute nondisplaced fractures of the posterior aspects of the left 10th and 11th ribs. CT Head/ C Spine 1/6: Neg. CONT: CT Chest/Abd 1/6: Wall thickening and defect within the bladder dome with intraluminal hyperdensity and perivesical fluid extending to the sigmoid mesocolon, concerning for bladder injury. Acute nondisplaced fractures of the posterior aspects of the left 10th and 11th ribs. CT Head/ C Spine 1/6: Neg./no known precautions/limitations

## 2019-01-08 NOTE — CONSULT NOTE ADULT - REASON FOR ADMISSION
Intraperitoneal Bladder Perforation
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
## 2019-01-08 NOTE — PROGRESS NOTE ADULT - SUBJECTIVE AND OBJECTIVE BOX
Urology PA Progress Note    Subjective:  Patient seen and examined at bedside. No acute events overnight. Pain controlled. Tolerating diet. Passing flatus. Ambulating    Objective:  Vital signs  T(F): , Max: 99.1 (01-07-19 @ 17:33)  HR: 84 (01-08-19 @ 05:31)  BP: 113/73 (01-08-19 @ 05:31)  SpO2: 97% (01-08-19 @ 05:31)  Wt(kg): --    Output     01-07 @ 07:01  -  01-08 @ 07:00  --------------------------------------------------------  IN: 800 mL / OUT: 2775 mL / NET: -1975 mL        Physical Exam:  Gen: NAD. AAOx3  Pulm: nonlabored  Abd: soft, ND, appropriate incisional tenderness. Midline surgical incision c/d/i with staples. HÉCTOR drain in place with sanguineous output  : mims in place- clear  Ext: Right shoulder sling    Labs:  01-07  17.68 / 45.4  /x      01-07  x     / x     /0.57                           14.7   17.68 )-----------( 204      ( 07 Jan 2019 09:16 )             45.4     01-07    135  |  102  |  9   ----------------------------<  108<H>  4.6   |  20<L>  |  0.57    Ca    8.9      07 Jan 2019 07:23  Phos  3.0     01-07  Mg     2.2     01-07            Cx:     Imaging:

## 2019-01-08 NOTE — PHYSICAL THERAPY INITIAL EVALUATION ADULT - ADDITIONAL COMMENTS
pt lives in private home with mother, 4-5 steps to enter +2 HRs (far apart), first floor set up. Prior to admission, pt was I with all functional mobility and ADLs without AD.
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
## 2019-01-09 LAB
ANION GAP SERPL CALC-SCNC: 12 MMOL/L — SIGNIFICANT CHANGE UP (ref 5–17)
BLD GP AB SCN SERPL QL: NEGATIVE — SIGNIFICANT CHANGE UP
BUN SERPL-MCNC: 10 MG/DL — SIGNIFICANT CHANGE UP (ref 7–23)
CALCIUM SERPL-MCNC: 9.3 MG/DL — SIGNIFICANT CHANGE UP (ref 8.4–10.5)
CHLORIDE SERPL-SCNC: 102 MMOL/L — SIGNIFICANT CHANGE UP (ref 96–108)
CO2 SERPL-SCNC: 24 MMOL/L — SIGNIFICANT CHANGE UP (ref 22–31)
CREAT SERPL-MCNC: 0.69 MG/DL — SIGNIFICANT CHANGE UP (ref 0.5–1.3)
GLUCOSE SERPL-MCNC: 103 MG/DL — HIGH (ref 70–99)
HCT VFR BLD CALC: 45.7 % — SIGNIFICANT CHANGE UP (ref 39–50)
HGB BLD-MCNC: 15.7 G/DL — SIGNIFICANT CHANGE UP (ref 13–17)
MCHC RBC-ENTMCNC: 31.8 PG — SIGNIFICANT CHANGE UP (ref 27–34)
MCHC RBC-ENTMCNC: 34.4 GM/DL — SIGNIFICANT CHANGE UP (ref 32–36)
MCV RBC AUTO: 92.7 FL — SIGNIFICANT CHANGE UP (ref 80–100)
PLATELET # BLD AUTO: 213 K/UL — SIGNIFICANT CHANGE UP (ref 150–400)
POTASSIUM SERPL-MCNC: 4.1 MMOL/L — SIGNIFICANT CHANGE UP (ref 3.5–5.3)
POTASSIUM SERPL-SCNC: 4.1 MMOL/L — SIGNIFICANT CHANGE UP (ref 3.5–5.3)
RBC # BLD: 4.93 M/UL — SIGNIFICANT CHANGE UP (ref 4.2–5.8)
RBC # FLD: 13.2 % — SIGNIFICANT CHANGE UP (ref 10.3–14.5)
RH IG SCN BLD-IMP: POSITIVE — SIGNIFICANT CHANGE UP
SODIUM SERPL-SCNC: 138 MMOL/L — SIGNIFICANT CHANGE UP (ref 135–145)
WBC # BLD: 10.42 K/UL — SIGNIFICANT CHANGE UP (ref 3.8–10.5)
WBC # FLD AUTO: 10.42 K/UL — SIGNIFICANT CHANGE UP (ref 3.8–10.5)

## 2019-01-09 PROCEDURE — 99232 SBSQ HOSP IP/OBS MODERATE 35: CPT

## 2019-01-09 RX ORDER — LIDOCAINE 4 G/100G
1 CREAM TOPICAL DAILY
Qty: 0 | Refills: 0 | Status: DISCONTINUED | OUTPATIENT
Start: 2019-01-09 | End: 2019-01-10

## 2019-01-09 RX ORDER — LIDOCAINE 4 G/100G
1 CREAM TOPICAL DAILY
Qty: 0 | Refills: 0 | Status: DISCONTINUED | OUTPATIENT
Start: 2019-01-09 | End: 2019-01-09

## 2019-01-09 RX ORDER — ENOXAPARIN SODIUM 100 MG/ML
40 INJECTION SUBCUTANEOUS DAILY
Qty: 0 | Refills: 0 | Status: DISCONTINUED | OUTPATIENT
Start: 2019-01-09 | End: 2019-01-10

## 2019-01-09 RX ORDER — ONDANSETRON 8 MG/1
4 TABLET, FILM COATED ORAL ONCE
Qty: 0 | Refills: 0 | Status: DISCONTINUED | OUTPATIENT
Start: 2019-01-09 | End: 2019-01-09

## 2019-01-09 RX ORDER — ACETAMINOPHEN 500 MG
1000 TABLET ORAL ONCE
Qty: 0 | Refills: 0 | Status: COMPLETED | OUTPATIENT
Start: 2019-01-10 | End: 2019-01-10

## 2019-01-09 RX ORDER — HYDROMORPHONE HYDROCHLORIDE 2 MG/ML
0.5 INJECTION INTRAMUSCULAR; INTRAVENOUS; SUBCUTANEOUS
Qty: 0 | Refills: 0 | Status: DISCONTINUED | OUTPATIENT
Start: 2019-01-09 | End: 2019-01-09

## 2019-01-09 RX ORDER — CEFAZOLIN SODIUM 1 G
1000 VIAL (EA) INJECTION EVERY 8 HOURS
Qty: 0 | Refills: 0 | Status: DISCONTINUED | OUTPATIENT
Start: 2019-01-09 | End: 2019-01-10

## 2019-01-09 RX ORDER — DEXTROSE MONOHYDRATE, SODIUM CHLORIDE, AND POTASSIUM CHLORIDE 50; .745; 4.5 G/1000ML; G/1000ML; G/1000ML
1000 INJECTION, SOLUTION INTRAVENOUS
Qty: 0 | Refills: 0 | Status: DISCONTINUED | OUTPATIENT
Start: 2019-01-09 | End: 2019-01-10

## 2019-01-09 RX ADMIN — HYDROMORPHONE HYDROCHLORIDE 0.5 MILLIGRAM(S): 2 INJECTION INTRAMUSCULAR; INTRAVENOUS; SUBCUTANEOUS at 22:25

## 2019-01-09 RX ADMIN — LIDOCAINE 1 PATCH: 4 CREAM TOPICAL at 12:33

## 2019-01-09 RX ADMIN — HYDROMORPHONE HYDROCHLORIDE 0.5 MILLIGRAM(S): 2 INJECTION INTRAMUSCULAR; INTRAVENOUS; SUBCUTANEOUS at 22:30

## 2019-01-09 RX ADMIN — HYDROMORPHONE HYDROCHLORIDE 0.5 MILLIGRAM(S): 2 INJECTION INTRAMUSCULAR; INTRAVENOUS; SUBCUTANEOUS at 22:07
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
## 2019-01-09 NOTE — PRE-OP CHECKLIST - AS BP NONINV SITE
left upper arm Quality 431: Preventive Care And Screening: Unhealthy Alcohol Use - Screening: Patient screened for unhealthy alcohol use using a single question and scores less than 2 times per year Quality 265: Biopsy Follow-Up: Biopsy results reviewed, communicated, tracked, and documented Quality 226: Preventive Care And Screening: Tobacco Use: Screening And Cessation Intervention: Patient screened for tobacco and never smoked Quality 110: Preventive Care And Screening: Influenza Immunization: Influenza immunization was not ordered or administered, reason not given Quality 131: Pain Assessment And Follow-Up: Pain assessment using a standardized tool is documented as negative, no follow-up plan required Quality 130: Documentation Of Current Medications In The Medical Record: Current Medications Documented Detail Level: Detailed
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
## 2019-01-09 NOTE — PRE-ANESTHESIA EVALUATION ADULT - NSANTHOSAYNRD_GEN_A_CORE
No. MANA screening performed.  STOP BANG Legend: 0-2 = LOW Risk; 3-4 = INTERMEDIATE Risk; 5-8 = HIGH Risk
No. MANA screening performed.  STOP BANG Legend: 0-2 = LOW Risk; 3-4 = INTERMEDIATE Risk; 5-8 = HIGH Risk
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
## 2019-01-09 NOTE — BRIEF OPERATIVE NOTE - OPERATION/FINDINGS
see op note; Closed reduction of mandible, Resection of mandibular thrid molar (L)
exploratory laparotomy, approx 6cm intraperitoneal defect identified at the dome of bladder, cystorrhaphy

## 2019-01-09 NOTE — PROGRESS NOTE ADULT - SUBJECTIVE AND OBJECTIVE BOX
Subjective  No overnight events. No suprapubic discomfort.  Objective    Vital signs  T(F): , Max: 99.4 (01-08-19 @ 16:59)  HR: 74 (01-09-19 @ 09:17)  BP: 124/77 (01-09-19 @ 09:17)  SpO2: 98% (01-09-19 @ 09:17)  Wt(kg): --    Output     01-08 @ 07:01  -  01-09 @ 07:00  --------------------------------------------------------  IN: 1600 mL / OUT: 2475 mL / NET: -875 mL    01-09 @ 07:01  -  01-09 @ 12:29  --------------------------------------------------------  IN: 0 mL / OUT: 0 mL / NET: 0 mL        Gen: NAD  Abd: soft, mild discomfort at staple line, ND  : mims output clear yellow    Labs      01-09 @ 08:06    WBC 10.42 / Hct 45.7  / SCr --       01-09 @ 07:17    WBC --    / Hct --    / SCr 0.69     Imaging
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
## 2019-01-09 NOTE — PROGRESS NOTE ADULT - SUBJECTIVE AND OBJECTIVE BOX
Plastic Surgery Progress Note (pg [facility]: [id number], NS: [phone])    SUBJECTIVE:  Patient seen and examined at bedside this AM. No acute events overnight. AF/VSS. Pain well controlled. NPO since midnight.     OBJECTIVE:     ** VITAL SIGNS / I&O's **    Vital Signs Last 24 Hrs  T(C): 36.8 (09 Jan 2019 07:02), Max: 37.4 (08 Jan 2019 16:59)  T(F): 98.3 (09 Jan 2019 07:02), Max: 99.4 (08 Jan 2019 16:59)  HR: 66 (09 Jan 2019 07:02) (66 - 93)  BP: 123/80 (09 Jan 2019 07:02) (116/74 - 145/81)  BP(mean): --  RR: 18 (09 Jan 2019 07:02) (18 - 18)  SpO2: 95% (09 Jan 2019 07:02) (95% - 99%)      08 Jan 2019 07:01  -  09 Jan 2019 07:00  --------------------------------------------------------  IN:    dextrose 5% + sodium chloride 0.45% with potassium chloride 20 mEq/L: 700 mL    Oral Fluid: 900 mL  Total IN: 1600 mL    OUT:    Indwelling Catheter - Urethral: 2475 mL  Total OUT: 2475 mL    Total NET: -875 mL    Physical Exam:     Gen: NAD, breathing comfortably on room air  HEENT: Swelling noted to left mandible. No stepoffs or crepitus of facial skeleton. SILT V1-V3 b/l. Mimetic muscle function intact.  Vision grossly intact. No diplopia, no restriction of EOM, no blurry vision.  No mobility of nasal bones. No septal hematoma. Hearing grossly intact. TTP left mandibular angle. No mobile teeth. Dentition intact. No intraoral laceration.  Ecchymoses of left buccal mucosa. No sublingual hematoma. JUSTIN 2cm. TMJ intact.         **MEDS**  acetaminophen   Tablet .. 650 milliGRAM(s) Oral every 6 hours PRN  dextrose 5% + sodium chloride 0.45% with potassium chloride 20 mEq/L 1000 milliLiter(s) IV Continuous <Continuous>  docusate sodium 100 milliGRAM(s) Oral three times a day  enoxaparin Injectable 40 milliGRAM(s) SubCutaneous daily  influenza   Vaccine 0.5 milliLiter(s) IntraMuscular once  lidocaine   Patch 1 Patch Transdermal daily  oxybutynin 5 milliGRAM(s) Oral every 8 hours PRN  oxyCODONE    IR 5 milliGRAM(s) Oral every 4 hours PRN  oxyCODONE    IR 10 milliGRAM(s) Oral every 4 hours PRN  senna 2 Tablet(s) Oral at bedtime      ** LABS **                          14.0   13.34 )-----------( 213      ( 08 Jan 2019 08:08 )             43.3     08 Jan 2019 07:06    136    |  101    |  10     ----------------------------<  94     4.1     |  25     |  0.64     Ca    9.1        08 Jan 2019 07:06  Phos  3.0       07 Jan 2019 07:23  Mg     2.2       07 Jan 2019 07:23        CAPILLARY BLOOD GLUCOSE

## 2019-01-09 NOTE — PRE-ANESTHESIA EVALUATION ADULT - NSRADCARDRESULTSFT_GEN_ALL_CORE
nasal bones intact.  deviated septum to right with spur in right cavity
Head CT: results noted. No issues.

## 2019-01-09 NOTE — OCCUPATIONAL THERAPY INITIAL EVALUATION ADULT - ANTICIPATED DISCHARGE DISPOSITION, OT EVAL
Outpatient OT once cleared by MD for ROM and strengthening RUE. Assist with ADLs as needed from family
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
## 2019-01-10 VITALS
DIASTOLIC BLOOD PRESSURE: 67 MMHG | RESPIRATION RATE: 18 BRPM | TEMPERATURE: 99 F | SYSTOLIC BLOOD PRESSURE: 122 MMHG | OXYGEN SATURATION: 96 % | HEART RATE: 80 BPM

## 2019-01-10 RX ORDER — CHLORHEXIDINE GLUCONATE 213 G/1000ML
15 SOLUTION TOPICAL
Qty: 1000 | Refills: 0 | OUTPATIENT
Start: 2019-01-10 | End: 2019-02-08

## 2019-01-10 RX ORDER — OXYCODONE HYDROCHLORIDE 5 MG/1
5 TABLET ORAL
Qty: 500 | Refills: 0 | OUTPATIENT
Start: 2019-01-10 | End: 2019-01-16

## 2019-01-10 RX ORDER — HYDROMORPHONE HYDROCHLORIDE 2 MG/ML
0.5 INJECTION INTRAMUSCULAR; INTRAVENOUS; SUBCUTANEOUS ONCE
Qty: 0 | Refills: 0 | Status: DISCONTINUED | OUTPATIENT
Start: 2019-01-10 | End: 2019-01-10

## 2019-01-10 RX ORDER — OXYCODONE HYDROCHLORIDE 5 MG/1
5 TABLET ORAL EVERY 4 HOURS
Qty: 0 | Refills: 0 | Status: DISCONTINUED | OUTPATIENT
Start: 2019-01-10 | End: 2019-01-10

## 2019-01-10 RX ORDER — OXYCODONE HYDROCHLORIDE 5 MG/1
10 TABLET ORAL
Qty: 0 | Refills: 0 | Status: DISCONTINUED | OUTPATIENT
Start: 2019-01-10 | End: 2019-01-10

## 2019-01-10 RX ORDER — CHLORHEXIDINE GLUCONATE 213 G/1000ML
15 SOLUTION TOPICAL
Qty: 0 | Refills: 0 | Status: DISCONTINUED | OUTPATIENT
Start: 2019-01-10 | End: 2019-01-10

## 2019-01-10 RX ADMIN — HYDROMORPHONE HYDROCHLORIDE 0.5 MILLIGRAM(S): 2 INJECTION INTRAMUSCULAR; INTRAVENOUS; SUBCUTANEOUS at 00:39

## 2019-01-10 RX ADMIN — OXYCODONE HYDROCHLORIDE 5 MILLIGRAM(S): 5 TABLET ORAL at 01:43

## 2019-01-10 RX ADMIN — CHLORHEXIDINE GLUCONATE 15 MILLILITER(S): 213 SOLUTION TOPICAL at 05:29

## 2019-01-10 RX ADMIN — HYDROMORPHONE HYDROCHLORIDE 0.5 MILLIGRAM(S): 2 INJECTION INTRAMUSCULAR; INTRAVENOUS; SUBCUTANEOUS at 01:09

## 2019-01-10 RX ADMIN — OXYCODONE HYDROCHLORIDE 5 MILLIGRAM(S): 5 TABLET ORAL at 02:30

## 2019-01-10 RX ADMIN — Medication 100 MILLIGRAM(S): at 05:29

## 2019-01-10 RX ADMIN — Medication 400 MILLIGRAM(S): at 03:35

## 2019-01-10 RX ADMIN — LIDOCAINE 1 PATCH: 4 CREAM TOPICAL at 11:21

## 2019-01-10 RX ADMIN — LIDOCAINE 1 PATCH: 4 CREAM TOPICAL at 03:33

## 2019-01-10 RX ADMIN — Medication 1000 MILLIGRAM(S): at 03:50

## 2019-01-10 NOTE — DIETITIAN INITIAL EVALUATION ADULT. - OTHER INFO
Patient seen for nutrition consult for diet education on full liquid diet. Pt denies any recent changes in weight. Current dosing wt noted as ~185 lbs, no signs of muscle/fat depletion upon visual assessment. Since admission pt has been intermittently NPO, now advanced to full liquid diet. Tolerating diet well with no acute GI distress. Pt reports he will need to be on full liquid diet for ~ 4weeks. Pt amendable to diet education of full liquid diet.

## 2019-01-10 NOTE — PROGRESS NOTE ADULT - SUBJECTIVE AND OBJECTIVE BOX
POST OPERATIVE DAY #: 1    SUBJECTIVE: Pt seen and examined at bedside. Doing well. Pain controlled. Tolerating liquids.  pt went to the OR yesterday with OMFS s/p closed reduction of mandible, resection of L mandibular 3rd molar. Pt tolerated procedure well without complications.   Denies n/v, SOB, CP, or palpitations.       Vital Signs Last 24 Hrs  T(C): 37.2 (10 Primo 2019 06:28), Max: 37.7 (10 Primo 2019 00:30)  T(F): 98.9 (10 Primo 2019 06:28), Max: 99.8 (10 Primo 2019 00:30)  HR: 79 (10 Primo 2019 06:28) (71 - 93)  BP: 143/84 (10 Primo 2019 06:28) (114/74 - 164/89)  BP(mean): 107 (09 Jan 2019 22:45) (107 - 120)  RR: 18 (10 Primo 2019 06:28) (13 - 161)  SpO2: 98% (10 Primo 2019 06:28) (94% - 100%)  I&O's Summary    09 Jan 2019 07:01  -  10 Primo 2019 07:00  --------------------------------------------------------  IN: 2250 mL / OUT: 2720 mL / NET: -470 mL      I&O's Detail    09 Jan 2019 07:01  -  10 Primo 2019 07:00  --------------------------------------------------------  IN:    dextrose 5% + sodium chloride 0.45% with potassium chloride 20 mEq/L: 1200 mL    dextrose 5% + sodium chloride 0.45% with potassium chloride 20 mEq/L: 900 mL    Solution: 50 mL    Solution: 100 mL  Total IN: 2250 mL    OUT:    Indwelling Catheter - Urethral: 2720 mL  Total OUT: 2720 mL    Total NET: -470 mL          Labs:                         15.7   10.42 )-----------( 213      ( 09 Jan 2019 08:06 )             45.7     01-09    138  |  102  |  10  ----------------------------<  103<H>  4.1   |  24  |  0.69    Ca    9.3      09 Jan 2019 07:17            Physical Exam:  General Appearance: NAD, A&O x3  HEENT: MMF intact. approp tender, minimal swelling, no active bleeding/hematoma  Abd: soft, NTND, no rebound or guarding, low midline incision c/d/i staples, HÉCTOR serosanguinous  Ext: WWP, left shoulder sling

## 2019-01-10 NOTE — DIETITIAN INITIAL EVALUATION ADULT. - NS AS NUTRI INTERV MEALS SNACK
General/healthful diet/Continue current diet as tolerated. Encourage PO intake of protein rich foods.
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
## 2019-01-10 NOTE — DIETITIAN INITIAL EVALUATION ADULT. - ORAL INTAKE PTA
good/Pt reports good PO intake and appetite PTA, denies any recent changes. Confirms NKFA, Pt denies chewing/swallowing difficulty, nausea, vomiting, diarrhea, constipation. Per chart no micronutrient supplementation noted PTA

## 2019-01-10 NOTE — PROGRESS NOTE ADULT - SUBJECTIVE AND OBJECTIVE BOX
Plastic Surgery Progress Note (pg [facility]: [id number], NS: [phone])    SUBJECTIVE:  Patient seen and examined at bedside this AM. POD1 from MMF fixation. AF/VSS. No acute events overnight. Wirecutters at bedside.     OBJECTIVE:     ** VITAL SIGNS / I&O's **    Vital Signs Last 24 Hrs  T(C): 37.2 (10 Primo 2019 06:28), Max: 37.7 (10 Primo 2019 00:30)  T(F): 98.9 (10 Primo 2019 06:28), Max: 99.8 (10 Primo 2019 00:30)  HR: 79 (10 Primo 2019 06:28) (71 - 93)  BP: 143/84 (10 Primo 2019 06:28) (114/74 - 164/89)  BP(mean): 107 (09 Jan 2019 22:45) (107 - 120)  RR: 18 (10 Primo 2019 06:28) (13 - 161)  SpO2: 98% (10 Primo 2019 06:28) (94% - 100%)      09 Jan 2019 07:01  -  10 Primo 2019 07:00  --------------------------------------------------------  IN:    dextrose 5% + sodium chloride 0.45% with potassium chloride 20 mEq/L: 1200 mL    dextrose 5% + sodium chloride 0.45% with potassium chloride 20 mEq/L: 900 mL    Solution: 50 mL    Solution: 100 mL  Total IN: 2250 mL    OUT:    Indwelling Catheter - Urethral: 2720 mL  Total OUT: 2720 mL    Total NET: -470 mL          ** PHYSICAL EXAM **    -- CONSTITUTIONAL: NAD.   -- HEENT: MMF intact. Appropriately tender. In normal occlusion       **MEDS**  ceFAZolin   IVPB 1000 milliGRAM(s) IV Intermittent every 8 hours  chlorhexidine 0.12% Liquid 15 milliLiter(s) Oral Mucosa two times a day  dextrose 5% + sodium chloride 0.45% with potassium chloride 20 mEq/L 1000 milliLiter(s) IV Continuous <Continuous>  enoxaparin Injectable 40 milliGRAM(s) SubCutaneous daily  influenza   Vaccine 0.5 milliLiter(s) IntraMuscular once  lidocaine   Patch 1 Patch Transdermal daily  oxyCODONE    Solution 5 milliGRAM(s) Oral every 4 hours PRN  oxyCODONE    Solution 10 milliGRAM(s) Oral four times a day PRN      ** LABS **                          15.7   10.42 )-----------( 213      ( 09 Jan 2019 08:06 )             45.7     09 Jan 2019 07:17    138    |  102    |  10     ----------------------------<  103    4.1     |  24     |  0.69     Ca    9.3        09 Jan 2019 07:17        CAPILLARY BLOOD GLUCOSE

## 2019-01-10 NOTE — PROGRESS NOTE ADULT - REASON FOR ADMISSION
Intraperitoneal Bladder Perforation

## 2019-01-10 NOTE — DIETITIAN INITIAL EVALUATION ADULT. - NS AS NUTRI INTERV MEDICAL AND FOOD SUPPLEMENTS
Commercial beverage/Recommend Ensure Enlive 2x per day RD to add Mighty Shakes 3x per day/Commercial beverage

## 2019-01-15 PROBLEM — Z00.00 ENCOUNTER FOR PREVENTIVE HEALTH EXAMINATION: Status: ACTIVE | Noted: 2019-01-15

## 2019-01-22 ENCOUNTER — FORM ENCOUNTER (OUTPATIENT)
Age: 34
End: 2019-01-22

## 2019-01-23 ENCOUNTER — OUTPATIENT (OUTPATIENT)
Dept: OUTPATIENT SERVICES | Facility: HOSPITAL | Age: 34
LOS: 1 days | End: 2019-01-23
Payer: COMMERCIAL

## 2019-01-23 ENCOUNTER — APPOINTMENT (OUTPATIENT)
Dept: RADIOLOGY | Facility: HOSPITAL | Age: 34
End: 2019-01-23

## 2019-01-23 DIAGNOSIS — S37.20XA UNSPECIFIED INJURY OF BLADDER, INITIAL ENCOUNTER: ICD-10-CM

## 2019-01-23 PROCEDURE — 51600 INJECTION FOR BLADDER X-RAY: CPT

## 2019-01-23 PROCEDURE — 74430 CONTRAST X-RAY BLADDER: CPT | Mod: 26

## 2019-01-23 PROCEDURE — 74430 CONTRAST X-RAY BLADDER: CPT

## 2019-01-25 ENCOUNTER — APPOINTMENT (OUTPATIENT)
Dept: UROLOGY | Facility: CLINIC | Age: 34
End: 2019-01-25
Payer: SELF-PAY

## 2019-01-25 VITALS
HEIGHT: 68 IN | SYSTOLIC BLOOD PRESSURE: 110 MMHG | BODY MASS INDEX: 25.39 KG/M2 | TEMPERATURE: 98 F | WEIGHT: 167.5 LBS | DIASTOLIC BLOOD PRESSURE: 70 MMHG | OXYGEN SATURATION: 98 % | HEART RATE: 95 BPM

## 2019-01-25 DIAGNOSIS — F17.200 NICOTINE DEPENDENCE, UNSPECIFIED, UNCOMPLICATED: ICD-10-CM

## 2019-01-25 DIAGNOSIS — Z78.9 OTHER SPECIFIED HEALTH STATUS: ICD-10-CM

## 2019-01-25 DIAGNOSIS — S37.20XA UNSPECIFIED INJURY OF BLADDER, INITIAL ENCOUNTER: ICD-10-CM

## 2019-01-25 PROCEDURE — 99024 POSTOP FOLLOW-UP VISIT: CPT

## 2019-01-25 NOTE — ASSESSMENT
[FreeTextEntry1] : Rosas catheter and skin staples were removed without difficulty. Postoperative care was discussed. He will follow up in the future if needed.

## 2019-01-25 NOTE — PHYSICAL EXAM
[General Appearance - Well Developed] : well developed [General Appearance - Well Nourished] : well nourished [Normal Appearance] : normal appearance [Well Groomed] : well groomed [General Appearance - In No Acute Distress] : no acute distress [Edema] : no peripheral edema [] : no respiratory distress [Respiration, Rhythm And Depth] : normal respiratory rhythm and effort [Exaggerated Use Of Accessory Muscles For Inspiration] : no accessory muscle use [Urethral Meatus] : meatus normal [Penis Abnormality] : normal uncircumcised penis [Urinary Bladder Findings] : the bladder was normal on palpation [Scrotum] : the scrotum was normal [Testes Mass (___cm)] : there were no testicular masses [FreeTextEntry1] : Rosas with clear urine

## 2019-01-25 NOTE — REVIEW OF SYSTEMS
[Recent Weight Loss (___ Lbs)] : recent [unfilled] ~Ulb weight loss [Negative] : Heme/Lymph [see HPI] : see HPI
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
## 2019-04-24 PROCEDURE — 73110 X-RAY EXAM OF WRIST: CPT

## 2019-04-24 PROCEDURE — 74177 CT ABD & PELVIS W/CONTRAST: CPT

## 2019-04-24 PROCEDURE — 90471 IMMUNIZATION ADMIN: CPT

## 2019-04-24 PROCEDURE — 76377 3D RENDER W/INTRP POSTPROCES: CPT

## 2019-04-24 PROCEDURE — 90715 TDAP VACCINE 7 YRS/> IM: CPT

## 2019-04-24 PROCEDURE — 96374 THER/PROPH/DIAG INJ IV PUSH: CPT | Mod: XU

## 2019-04-24 PROCEDURE — 84100 ASSAY OF PHOSPHORUS: CPT

## 2019-04-24 PROCEDURE — 71260 CT THORAX DX C+: CPT

## 2019-04-24 PROCEDURE — 70450 CT HEAD/BRAIN W/O DYE: CPT

## 2019-04-24 PROCEDURE — 80053 COMPREHEN METABOLIC PANEL: CPT

## 2019-04-24 PROCEDURE — C1713: CPT

## 2019-04-24 PROCEDURE — 80048 BASIC METABOLIC PNL TOTAL CA: CPT

## 2019-04-24 PROCEDURE — 85730 THROMBOPLASTIN TIME PARTIAL: CPT

## 2019-04-24 PROCEDURE — 72192 CT PELVIS W/O DYE: CPT

## 2019-04-24 PROCEDURE — 83735 ASSAY OF MAGNESIUM: CPT

## 2019-04-24 PROCEDURE — 73120 X-RAY EXAM OF HAND: CPT

## 2019-04-24 PROCEDURE — 85610 PROTHROMBIN TIME: CPT

## 2019-04-24 PROCEDURE — 73090 X-RAY EXAM OF FOREARM: CPT

## 2019-04-24 PROCEDURE — 83690 ASSAY OF LIPASE: CPT

## 2019-04-24 PROCEDURE — 73060 X-RAY EXAM OF HUMERUS: CPT

## 2019-04-24 PROCEDURE — 97161 PT EVAL LOW COMPLEX 20 MIN: CPT

## 2019-04-24 PROCEDURE — 86900 BLOOD TYPING SEROLOGIC ABO: CPT

## 2019-04-24 PROCEDURE — 86901 BLOOD TYPING SEROLOGIC RH(D): CPT

## 2019-04-24 PROCEDURE — 70486 CT MAXILLOFACIAL W/O DYE: CPT

## 2019-04-24 PROCEDURE — 80307 DRUG TEST PRSMV CHEM ANLYZR: CPT

## 2019-04-24 PROCEDURE — 51702 INSERT TEMP BLADDER CATH: CPT

## 2019-04-24 PROCEDURE — 85027 COMPLETE CBC AUTOMATED: CPT

## 2019-04-24 PROCEDURE — 99285 EMERGENCY DEPT VISIT HI MDM: CPT | Mod: 25

## 2019-04-24 PROCEDURE — 97165 OT EVAL LOW COMPLEX 30 MIN: CPT

## 2019-04-24 PROCEDURE — 72125 CT NECK SPINE W/O DYE: CPT

## 2019-04-24 PROCEDURE — 73030 X-RAY EXAM OF SHOULDER: CPT

## 2019-04-24 PROCEDURE — 86850 RBC ANTIBODY SCREEN: CPT

## 2019-06-06 NOTE — H&P ADULT - DOES THIS PATIENT HAVE A HISTORY OF OR HAS BEEN DX WITH HEART FAILURE?
unknown

## 2022-06-29 NOTE — ED ADULT NURSE NOTE - NSIMPLEMENTINTERV_GEN_ALL_ED
yes/No Formal Activity Order in the computer; spoke with MIKA Cope prior to PT evaluation--> Pt OK for PT consult
Implemented All Fall with Harm Risk Interventions:  [place] to call system. Call bell, personal items and telephone within reach. Instruct patient to call for assistance. Room bathroom lighting operational. Non-slip footwear when patient is off stretcher. Physically safe environment: no spills, clutter or unnecessary equipment. Stretcher in lowest position, wheels locked, appropriate side rails in place. Provide visual cue, wrist band, yellow gown, etc. Monitor gait and stability. Monitor for mental status changes and reorient to person, place, and time. Review medications for side effects contributing to fall risk. Reinforce activity limits and safety measures with patient and family. Provide visual clues: red socks.
Detail Level: Zone
Otc Regimen: R essentials rejuvenating eye cream
Otc Regimen: Proscriptx conditioner, shampoo, vitamin

## 2023-06-09 NOTE — OCCUPATIONAL THERAPY INITIAL EVALUATION ADULT - PATIENT PROFILE REVIEW, REHAB EVAL
[facility]  Physical Therapy Initial Evaluation    Name: Xander Sanchez  Clinic Number: [id number]    Therapy Diagnosis:   Encounter Diagnoses   Name Primary?    Chronic neck pain     Decreased ROM of neck     Decreased strength      Physician: Diomedes Martin MD    Physician Orders: PT Eval and Treat: continuation of therapy, work on neck exercises and evaluation for his gait.   Medical Diagnosis from Referral: M54.2,G89.29 (ICD-10-CM) - Chronic neck pain  Evaluation Date: 6/9/2023  Authorization Period Expiration: 05/31/2024  Plan of Care Expiration: 7/21/23  Visit # / Visits authorized: 1/1  FOTO: 1/5  PTA Visit: 0/6    Time In: 9:10 AM  Time Out: 10:00 AM  Total Billable Time: 50 minutes (1 TE + 1 LCE)    Precautions: Standard, DM II, HTN, CRPS, COPD, Asthma    Subjective   Date of onset: 4/2023  History of current condition - Xander reports: started having neck and right arm pain as far back as 2005. Right arm was always worse than his neck pain, and received 2-3 surgeries due to nerve impingements. First neck surgery was 2014 and 2nd surgery in 2015. Was in PT/OT earlier this year, has to stop due to financial reasons. Felt like his balance was getting better, right foot drop was improving, and neck was feeling good at that time. Recently his neck has been feeling a little tighter and having some pain at the sides of his neck. Would like to work on his balance and get his neck feeling better too. Reports numbness and tingling mainly confined to his right thumb, having right arm stiffness, and has one fall about 5 months ago. Feels unsteady all the time, and has near falls. Does get some right anterior shoulder and chest pinching pain with certain movements and at rest at times.      Medical History:   Past Medical History:   Diagnosis Date    Allergy     Arthritis     Asthma     Cardiomyopathy     Cataract     Cervical radicular pain     Cervical spondylosis with myelopathy  yes 10/17/2012    Chronic bronchitis     Chronic systolic dysfunction of left ventricle 07/27/2015    Constipation 07/27/2015    COPD (chronic obstructive pulmonary disease)     CRPS (complex regional pain syndrome type I) 12/29/2014    Diabetes mellitus, type 2     DM type 2 (diabetes mellitus, type 2) 07/27/2015    Enlarged prostate 07/27/2015    Enuresis 07/06/2018    Hypertension     ICD (implantable cardiac defibrillator) in place     Mixed hyperlipidemia 02/28/2018    Presence of biventricular AICD 07/27/2015    Type 2 diabetes mellitus with diabetic neuropathy 02/01/2016    Urinary tract infection     pt does not know       Surgical History:   Xander Sanchez  has a past surgical history that includes Cervical spine surgery; Cardiac defibrillator placement; Spine surgery; Colonoscopy (N/A, 7/19/2017); Cataract extraction w/  intraocular lens implant (Right, 11/10/2020); and Cataract extraction w/  intraocular lens implant (Left, 11/24/2020).    Medications:   Xander has a current medication list which includes the following prescription(s): alcohol swabs, aspirin, baclofen, true metrix level 1, true metrix glucose test strip, blood-glucose meter, econazole nitrate, advair hfa, gabapentin, hydrochlorothiazide, hydrocodone-acetaminophen, lancets, metoprolol succinate, mupirocin, oxybutynin, pantoprazole, pravastatin, tamsulosin, and valsartan.    Allergies:   Review of patient's allergies indicates:   Allergen Reactions    Ciprofloxacin Nausea And Vomiting        Imaging: See EMR for imaging    Prior Therapy: yes in 2023  Social History: flight to get to 2nd floor of home  Occupation: retired,   Prior Level of Function: no neck pain in the past  Current Level of Function Limitations: unsteady on his feet, right foot drag, difficulty in transitions, and balance concerns. Mild neck pain and mostly stiffness the past couple of months  Pts goals: be more steady on his feet and to get his right arm and neck  moving better    Pain:  Current 5/10, worst 7/10, best 3/10   Location: right side of neck and right arm  Description: Aching, Tight, and stiffness  Aggravating Factors: see current functional limitations  Easing Factors: medication, laying on back    Objective     Reflexes:    Clonus: 1 beat on right, 0 on left   Rojas's Test: negative Bilateral    Babinski: WNL Bilateral   Sensation: decreased light touch in right arm  Palpation: +tender to palpation at right upper trap and cervical facet joint at lower cervical spine  Posture: CTJ and thoracic kyphosis, forward head  Resting scapular position: slight right scapular abduction and winging  Handedness: right    A/PROM and MMT:    * = neck pain with testing  NT = Not tested  AROM: CERVICAL THORACIC   Flexion 49 75%   Extension 13 75%   Right side bending 8 80%   Left side bending 9 80%   Right rotation 33 60%   Left rotation 27 90%     Shoulder  Right   Left  Pain/Dysfunction with Movement    AROM PROM MMT AROM PROM MMT    Upper trap (C4) WFL WFL NT WFL WFL NT    Biceps (C5,6) WFL WFL 5/5 WFL WFL 5/5    Wrist ext (C6) WFL WFL 5/5 WFL WFL 5/5    Triceps (C7) WFL WFL 4/5 WFL WFL 5/5    Wrist flexion (C7) 50% 75% 2+/5 100% 100% 5/5     strength and thumb ext (C8) WFL WFL 4-/5 WFL WFL 5/5    Finger abd/add (T1) WFL WFL 4/5 WFL WFL 5/5    Serratus anterior WFL WFL NT WFL WFL NT    flexion WFL WFL 4/5 WFL WFL 5/5    extension WFL WFL 5/5 WFL WFL 5/5    Abduction (C5) WFL WFL 4/5 WFL WFL 5/5    adduction WFL WFL NT WFL WFL NT    Internal rotation WFL WFL 5/5 WFL WFL 5/5    ER at 90° abd WFL WFL NT WFL WFL NT    ER at 0° abd WFL WFL 4+/5 WFL WFL 5/5    Middle trap WFL WFL NT WFL WFL NT    Lower trap WFL WFL NT WFL WFL NT      Special Tests:  Sharp Ashley = not tested  Alar Ligament = not tested  Hussain's Test = not tested  Spurling's test = NT  Quadrant Testing = not tested  Cervical Distraction Test = NT  Deep Neck Flexor Endurance Test (2.5 cm off mat, norm>38'')  = poor  ULTT: not tested    Cervical myelopathy cluster: (1/5 rule out, 3/5 rule in)  - Gait deviations, (+) Rojas, (+) Inverted supinator sign, (+) Babinski, Age>46 y/o  - 1/5    CMS Impairment/Limitation/Restriction for FOTO NECK Survey    Therapist reviewed FOTO scores for Xander Sanchez on 6/9/2023.   FOTO documents entered into [employer] - see Media section.    Limitation Score: 33%  Category: Mobility  Predicted: 30%     TREATMENT   Total Treatment time separate from Evaluation: 10 minutes    Xander received therapeutic exercises to develop strength, endurance, ROM, flexibility and posture for 10 minutes including:  Education    Home Exercises and Patient Education Provided  Education provided:   - proper body mechanics with lifting/carrying  - course of therapy, prognosis    Written Home Exercises Provided: yes.  Exercises were reviewed and Xander was able to demonstrate them prior to the end of the session.  Xander demonstrated good  understanding of the education provided.     See EMR under Patient Instructions for exercises provided  next visit. .    Assessment   Xander is a 77 y.o. male referred to outpatient Physical Therapy with a medical diagnosis of Chronic neck pain.  Pt currently presents with mild neck pain, decreased cervical range of motion since cervical fusion in 2015, decreased cervical strength, decreased coordination, poor posture, impaired balance and gait, and functional deficits with primarily walking and standing balance activities. We will resume balance and gait training primarily with supplemental cervical stability strengthening due to fusion limitation for mobility. Pt would benefit from skilled PT consisting of gait training, muscular skeletal stretching/strengthening, manual therapy, neuro muscular re-education, and modalities prn to address limitations and increase functional mobility.    Pt prognosis is Fair.   Pt will benefit from skilled outpatient Physical Therapy to address  the deficits stated above and in the chart below, provide pt/family education, and to maximize pt's level of independence.     Plan of care discussed with patient: Yes  Pt's spiritual, cultural and educational needs considered and patient is agreeable to the plan of care and goals as stated below:     Anticipated Barriers for therapy: comorbidities, cervical myelopathy, chronicity of impairments of 3+ years    Medical Necessity is demonstrated by the following  History  Co-morbidities and personal factors that may impact the plan of care Co-morbidities:   DM II, HTN, CRPS, COPD, Asthma    Personal Factors:   no deficits     high   Examination  Body Structures and Functions, activity limitations and participation restrictions that may impact the plan of care Body Regions:   head  neck  upper extremities  trunk    Body Systems:    gross symmetry  ROM  strength  gross coordinated movement  balance  gait  transfers  transitions  motor control    Participation Restrictions:   None     Activity limitations:   Learning and applying knowledge  no deficits    General Tasks and Commands  no deficits    Communication  no deficits    Mobility  walking  driving (bike, car, motorcycle)  Fine hand control    Self care  no deficits    Domestic Life  doing house work (cleaning house, washing dishes, laundry)    Interactions/Relationships  no deficits    Life Areas  no deficits    Community and Social Life  no deficits         high   Clinical Presentation stable and uncomplicated low   Decision Making/ Complexity Score: low     GOALS: Short Term Goals: 4 weeks  1. Pt will demo good deep neck flexor strength to improve cervical stabilization.  2. Increase cervical ROM by 5 degrees in rotation in order to perform ADLs with decreased difficulty.  3. Pt to tolerate HEP to improve ROM and independence with ADL's.    Long Term Goals: 8 weeks  1. Pt will score 24/30 on FGA for decreased fall risk.  2. Pt will score </=9'' on TUG for  decreased fall risk.  3. Pt will score 8 reps on 30'' STS Test without upper extremity use for increased functional strength and improved weight shift - body mechanics.   4. Pt will report at </= 30% impaired on FOTO neck score for neck pain disability to demonstrate decrease in disability and improvement in neck pain.    Plan   Plan of care Certification: 6/9/2023 to 7/21/23.    Outpatient Physical Therapy 2 times weekly for 8 weeks to include the following interventions: Aquatic Therapy, Cervical/Lumbar Traction, Electrical Stimulation, Gait Training, Manual Therapy, Moist Heat/ Ice, Neuromuscular Re-ed, Orthotic Management and Training, Patient Education, Self Care, Therapeutic Activites and Therapeutic Exercise.     Luis Walls, PT

## 2025-05-23 NOTE — ED ADULT NURSE REASSESSMENT NOTE - NS ED NURSE REASSESS COMMENT FT1
As per Dr. Mason, pt with multiple fractures and bladder rupture.  Spoke to Nurse Manager Sandeep.  As per Dr. Mason, and nurse manager pt transferred to [facility] room 25.  Rosas catheter inserted by urology team, draining 200cc of hematuria.  Report given to CC nurse Tamera at 08:25am. 36.8